# Patient Record
Sex: MALE | Race: WHITE | NOT HISPANIC OR LATINO | Employment: FULL TIME | URBAN - METROPOLITAN AREA
[De-identification: names, ages, dates, MRNs, and addresses within clinical notes are randomized per-mention and may not be internally consistent; named-entity substitution may affect disease eponyms.]

---

## 2017-05-16 ENCOUNTER — APPOINTMENT (OUTPATIENT)
Dept: GENERAL RADIOLOGY | Facility: HOSPITAL | Age: 27
End: 2017-05-16

## 2017-05-16 ENCOUNTER — HOSPITAL ENCOUNTER (EMERGENCY)
Facility: HOSPITAL | Age: 27
Discharge: HOME | End: 2017-05-17
Attending: EMERGENCY MEDICINE | Admitting: EMERGENCY MEDICINE

## 2017-05-16 VITALS
HEART RATE: 111 BPM | DIASTOLIC BLOOD PRESSURE: 106 MMHG | BODY MASS INDEX: 40.63 KG/M2 | RESPIRATION RATE: 18 BRPM | TEMPERATURE: 98.7 F | HEIGHT: 72 IN | OXYGEN SATURATION: 94 % | WEIGHT: 300 LBS | SYSTOLIC BLOOD PRESSURE: 172 MMHG

## 2017-05-16 DIAGNOSIS — S56.912A FOREARM STRAIN, LEFT, INITIAL ENCOUNTER: ICD-10-CM

## 2017-05-16 DIAGNOSIS — S53.401A ELBOW SPRAIN, RIGHT, INITIAL ENCOUNTER: ICD-10-CM

## 2017-05-16 DIAGNOSIS — S53.402A ELBOW SPRAIN, LEFT, INITIAL ENCOUNTER: ICD-10-CM

## 2017-05-16 DIAGNOSIS — S63.501A WRIST SPRAIN, RIGHT, INITIAL ENCOUNTER: ICD-10-CM

## 2017-05-16 DIAGNOSIS — S80.01XA CONTUSION OF RIGHT KNEE, INITIAL ENCOUNTER: ICD-10-CM

## 2017-05-16 DIAGNOSIS — S63.502A WRIST SPRAIN, LEFT, INITIAL ENCOUNTER: Primary | ICD-10-CM

## 2017-05-16 DIAGNOSIS — S56.911A FOREARM STRAIN, RIGHT, INITIAL ENCOUNTER: ICD-10-CM

## 2017-05-16 PROCEDURE — 99283 EMERGENCY DEPT VISIT LOW MDM: CPT

## 2017-05-16 PROCEDURE — 25010000002 TDAP 5-2.5-18.5 LF-MCG/0.5 SUSPENSION

## 2017-05-16 PROCEDURE — 73130 X-RAY EXAM OF HAND: CPT

## 2017-05-16 PROCEDURE — 90471 IMMUNIZATION ADMIN: CPT

## 2017-05-16 PROCEDURE — 73060 X-RAY EXAM OF HUMERUS: CPT

## 2017-05-16 PROCEDURE — 73562 X-RAY EXAM OF KNEE 3: CPT

## 2017-05-16 PROCEDURE — 90715 TDAP VACCINE 7 YRS/> IM: CPT

## 2017-05-16 PROCEDURE — 73090 X-RAY EXAM OF FOREARM: CPT

## 2017-05-16 PROCEDURE — 99284 EMERGENCY DEPT VISIT MOD MDM: CPT | Performed by: EMERGENCY MEDICINE

## 2017-05-16 RX ADMIN — TETANUS TOXOID, REDUCED DIPHTHERIA TOXOID AND ACELLULAR PERTUSSIS VACCINE, ADSORBED 0.5 ML: 5; 2.5; 8; 8; 2.5 SUSPENSION INTRAMUSCULAR at 22:56

## 2017-05-17 ENCOUNTER — HOSPITAL ENCOUNTER (OUTPATIENT)
Dept: GENERAL RADIOLOGY | Facility: HOSPITAL | Age: 27
Discharge: HOME | End: 2017-05-17
Attending: EMERGENCY MEDICINE | Admitting: EMERGENCY MEDICINE

## 2017-05-17 ENCOUNTER — APPOINTMENT (OUTPATIENT)
Dept: GENERAL RADIOLOGY | Facility: HOSPITAL | Age: 27
End: 2017-05-17

## 2017-05-17 ENCOUNTER — TRANSCRIBE ORDERS (OUTPATIENT)
Dept: ADMINISTRATIVE | Facility: HOSPITAL | Age: 27
End: 2017-05-17

## 2017-05-17 DIAGNOSIS — S59.902A ELBOW INJURY, LEFT, INITIAL ENCOUNTER: ICD-10-CM

## 2017-05-17 DIAGNOSIS — M25.571 RIGHT ANKLE PAIN, UNSPECIFIED CHRONICITY: Primary | ICD-10-CM

## 2017-05-17 DIAGNOSIS — M25.522 LEFT ELBOW PAIN: ICD-10-CM

## 2017-05-17 DIAGNOSIS — M25.571 RIGHT ANKLE PAIN, UNSPECIFIED CHRONICITY: ICD-10-CM

## 2017-05-17 PROCEDURE — 73080 X-RAY EXAM OF ELBOW: CPT

## 2017-05-17 RX ORDER — CYCLOBENZAPRINE HCL 10 MG
10 TABLET ORAL 3 TIMES DAILY PRN
Qty: 24 TABLET | Refills: 0 | Status: SHIPPED | OUTPATIENT
Start: 2017-05-17 | End: 2022-07-21

## 2017-05-17 RX ORDER — TRAMADOL HYDROCHLORIDE 50 MG/1
TABLET ORAL
Qty: 30 TABLET | Refills: 0 | Status: SHIPPED | OUTPATIENT
Start: 2017-05-17 | End: 2017-06-21

## 2017-05-22 ENCOUNTER — OFFICE VISIT (OUTPATIENT)
Dept: ORTHOPEDIC SURGERY | Facility: CLINIC | Age: 27
End: 2017-05-22

## 2017-05-22 VITALS
WEIGHT: 300 LBS | DIASTOLIC BLOOD PRESSURE: 91 MMHG | HEIGHT: 72 IN | SYSTOLIC BLOOD PRESSURE: 134 MMHG | BODY MASS INDEX: 40.63 KG/M2 | HEART RATE: 106 BPM

## 2017-05-22 DIAGNOSIS — S52.135A CLOSED NONDISPLACED FRACTURE OF NECK OF LEFT RADIUS, INITIAL ENCOUNTER: Primary | ICD-10-CM

## 2017-05-22 PROCEDURE — 99203 OFFICE O/P NEW LOW 30 MIN: CPT | Performed by: ORTHOPAEDIC SURGERY

## 2017-06-07 ENCOUNTER — OFFICE VISIT (OUTPATIENT)
Dept: ORTHOPEDIC SURGERY | Facility: CLINIC | Age: 27
End: 2017-06-07

## 2017-06-07 VITALS — WEIGHT: 300 LBS | HEIGHT: 72 IN | BODY MASS INDEX: 40.63 KG/M2

## 2017-06-07 DIAGNOSIS — R52 PAIN: Primary | ICD-10-CM

## 2017-06-07 DIAGNOSIS — S52.135A CLOSED NONDISPLACED FRACTURE OF NECK OF LEFT RADIUS, INITIAL ENCOUNTER: ICD-10-CM

## 2017-06-07 DIAGNOSIS — S40.021D CONTUSION OF RIGHT UPPER ARM, SUBSEQUENT ENCOUNTER: ICD-10-CM

## 2017-06-07 PROCEDURE — 99024 POSTOP FOLLOW-UP VISIT: CPT | Performed by: ORTHOPAEDIC SURGERY

## 2017-06-07 PROCEDURE — 73080 X-RAY EXAM OF ELBOW: CPT | Performed by: ORTHOPAEDIC SURGERY

## 2017-06-07 RX ORDER — MELOXICAM 15 MG/1
15 TABLET ORAL DAILY
Qty: 30 TABLET | Refills: 5 | Status: SHIPPED | OUTPATIENT
Start: 2017-06-07 | End: 2022-07-21

## 2017-06-07 RX ORDER — MELOXICAM 15 MG/1
15 TABLET ORAL DAILY
Qty: 30 TABLET | Refills: 5 | Status: SHIPPED | OUTPATIENT
Start: 2017-06-07 | End: 2017-06-07 | Stop reason: SDUPTHER

## 2017-06-07 NOTE — PROGRESS NOTES
Subjective; left elbow pain, right arm pain   Patient ID: Lexx Reyna is a 26 y.o. male.    Chief Complaint:    History of Present Illness patient seen in follow-up to the work-related injury that resulted nondisplaced radial neck fracture contusion of the right upper extremity.  The splint is removed from the left upper extremity is limited range of motion as expected.  Still having mild to moderate discomfort in the right upper extremity sores use of the elbow and the hand.       Social History     Occupational History   • Not on file.     Social History Main Topics   • Smoking status: Never Smoker   • Smokeless tobacco: Never Used   • Alcohol use No   • Drug use: No   • Sexual activity: Defer      Review of Systems   Constitutional: Negative for chills, diaphoresis, fever and unexpected weight change.   HENT: Negative for hearing loss, nosebleeds, sore throat and tinnitus.    Eyes: Negative for pain and visual disturbance.   Respiratory: Negative for cough, shortness of breath and wheezing.    Cardiovascular: Negative for chest pain and palpitations.   Gastrointestinal: Negative for abdominal pain, diarrhea, nausea and vomiting.   Endocrine: Negative for cold intolerance, heat intolerance and polydipsia.   Genitourinary: Negative for difficulty urinating, dysuria and hematuria.   Musculoskeletal: Positive for joint swelling and myalgias. Negative for arthralgias.   Skin: Negative for rash and wound.   Allergic/Immunologic: Negative for environmental allergies.   Neurological: Negative for dizziness, syncope and numbness.   Hematological: Does not bruise/bleed easily.   Psychiatric/Behavioral: Negative for dysphoric mood and sleep disturbance. The patient is not nervous/anxious.          History reviewed. No pertinent past medical history.  History reviewed. No pertinent surgical history.  Family History   Problem Relation Age of Onset   • No Known Problems Mother    • Kidney disease Father           Objective:  There were no vitals filed for this visit.  Last 3 weights    06/07/17  0909   Weight: 300 lb (136 kg)     Body mass index is 40.69 kg/(m^2).       Ortho Exam  x-ray of the left elbow shows a nondisplaced fracture no change in the position compared to previous x-rays.  On exam there is no motor deficit he only has probably 30-40° of range of motion with regard to the elbow.  somewhere between 30° to 70°.  Less than 10° of supination full pronation.  No motor deficit good distal pulses.  With regard to the right upper extremity again I reviewed those x-rays done on the time of the injury is no acute fracture.  He lacks maybe 10° of full extension of the elbow.  Full range of motion of the wrist and hand but complains of weakness.    Assessment:       1. Pain          Plan:  We'll begin Mobic 15 a day begin moist heat to both upper extremities return to see me in 2 weeks if still symptomatic we'll begin physical therapy off work until seen          Work Status:    CHELSI query complete.    Orders:  Orders Placed This Encounter   Procedures   • XR Elbow 3+ View Left       Medications:  No orders of the defined types were placed in this encounter.      Followup:  No Follow-up on file.          Dragon transcription disclaimer     Much of this encounter note is an electronic transcription/translation of spoken language to printed text. The electronic translation of spoken language may permit erroneous, or at times, nonsensical words or phrases to be inadvertently transcribed. Although I have reviewed the note for such errors, some may still exist.

## 2017-06-21 ENCOUNTER — OFFICE VISIT (OUTPATIENT)
Dept: ORTHOPEDIC SURGERY | Facility: CLINIC | Age: 27
End: 2017-06-21

## 2017-06-21 DIAGNOSIS — S52.135A CLOSED NONDISPLACED FRACTURE OF NECK OF LEFT RADIUS, INITIAL ENCOUNTER: ICD-10-CM

## 2017-06-21 DIAGNOSIS — R52 PAIN: Primary | ICD-10-CM

## 2017-06-21 DIAGNOSIS — S40.021D CONTUSION OF RIGHT UPPER ARM, SUBSEQUENT ENCOUNTER: ICD-10-CM

## 2017-06-21 PROCEDURE — 99024 POSTOP FOLLOW-UP VISIT: CPT | Performed by: ORTHOPAEDIC SURGERY

## 2017-06-21 NOTE — PROGRESS NOTES
Subjective: Status post left radial neck fracture     Patient ID: Lexx Reyna is a 26 y.o. male.    Chief Complaint:    History of Present Illness 26-year-old male is now approximate 5 weeks out from his work-related injury of the radial neck fracture.  His motion is improving but still having some pain has not regained full use of that arm.       Social History     Occupational History   • Not on file.     Social History Main Topics   • Smoking status: Never Smoker   • Smokeless tobacco: Never Used   • Alcohol use No   • Drug use: No   • Sexual activity: Defer      Review of Systems   Constitutional: Negative for chills, diaphoresis, fever and unexpected weight change.   HENT: Negative for hearing loss, nosebleeds, sore throat and tinnitus.    Eyes: Negative for pain and visual disturbance.   Respiratory: Negative for cough, shortness of breath and wheezing.    Cardiovascular: Negative for chest pain and palpitations.   Gastrointestinal: Negative for abdominal pain, diarrhea, nausea and vomiting.   Endocrine: Negative for cold intolerance, heat intolerance and polydipsia.   Genitourinary: Negative for difficulty urinating, dysuria and hematuria.   Musculoskeletal: Positive for joint swelling and myalgias. Negative for arthralgias.   Skin: Negative for rash and wound.   Allergic/Immunologic: Negative for environmental allergies.   Neurological: Negative for dizziness, syncope and numbness.   Hematological: Does not bruise/bleed easily.   Psychiatric/Behavioral: Negative for dysphoric mood and sleep disturbance. The patient is not nervous/anxious.    All other systems reviewed and are negative.        History reviewed. No pertinent past medical history.  History reviewed. No pertinent surgical history.  Family History   Problem Relation Age of Onset   • No Known Problems Mother    • Kidney disease Father          Objective:  There were no vitals filed for this visit.  There were no vitals filed for this  visit.  There is no height or weight on file to calculate BMI.       Ortho Exam  is alert and oriented ×3.  He has 20° to 15° of extension flexion about 125°.  Full pronation supination.  No motor deficit good distal pulses no sensory loss.  No appreciable swelling noted.    Assessment:       1. Pain    2. Closed nondisplaced fracture of neck of left radius, initial encounter    3. Contusion of right upper arm, subsequent encounter          Plan:      continue working on stretching exercises.  Reviewed a stretching program with him.  Continue the Mobic.  Return to see me in 2 weeks reevaluate regarding returning to work.  Off work until seen.      Work Status:    CHELSI query complete.    Orders:  No orders of the defined types were placed in this encounter.      Medications:  No orders of the defined types were placed in this encounter.      Followup:  Return in about 2 weeks (around 7/5/2017).          Dragon transcription disclaimer     Much of this encounter note is an electronic transcription/translation of spoken language to printed text. The electronic translation of spoken language may permit erroneous, or at times, nonsensical words or phrases to be inadvertently transcribed. Although I have reviewed the note for such errors, some may still exist.

## 2017-07-06 ENCOUNTER — OFFICE VISIT (OUTPATIENT)
Dept: ORTHOPEDIC SURGERY | Facility: CLINIC | Age: 27
End: 2017-07-06

## 2017-07-06 DIAGNOSIS — R52 PAIN: Primary | ICD-10-CM

## 2017-07-06 DIAGNOSIS — S52.135A CLOSED NONDISPLACED FRACTURE OF NECK OF LEFT RADIUS, INITIAL ENCOUNTER: ICD-10-CM

## 2017-07-06 DIAGNOSIS — S40.021D CONTUSION OF RIGHT UPPER ARM, SUBSEQUENT ENCOUNTER: ICD-10-CM

## 2017-07-06 PROCEDURE — 99024 POSTOP FOLLOW-UP VISIT: CPT | Performed by: ORTHOPAEDIC SURGERY

## 2017-07-06 NOTE — PROGRESS NOTES
Subjective: Left radial neck fracture     Patient ID: Lexx Reyna is a 26 y.o. male.    Chief Complaint:    History of Present Illness patient is 6-7 weeks out from his injury.  He is improving as far as regaining extension lacks just a few degrees of extension.  Still some pain with any type of exertional activity.       Social History     Occupational History   • Not on file.     Social History Main Topics   • Smoking status: Never Smoker   • Smokeless tobacco: Never Used   • Alcohol use No   • Drug use: No   • Sexual activity: Defer      Review of Systems   Constitutional: Negative for chills, diaphoresis, fever and unexpected weight change.   HENT: Negative for hearing loss, nosebleeds, sore throat and tinnitus.    Eyes: Negative for pain and visual disturbance.   Respiratory: Negative for cough, shortness of breath and wheezing.    Cardiovascular: Negative for chest pain and palpitations.   Gastrointestinal: Negative for abdominal pain, diarrhea, nausea and vomiting.   Endocrine: Negative for cold intolerance, heat intolerance and polydipsia.   Genitourinary: Negative for difficulty urinating, dysuria and hematuria.   Musculoskeletal: Positive for joint swelling. Negative for arthralgias and myalgias.   Skin: Negative for rash and wound.   Allergic/Immunologic: Negative for environmental allergies.   Neurological: Negative for dizziness, syncope and numbness.   Hematological: Does not bruise/bleed easily.   Psychiatric/Behavioral: Negative for dysphoric mood and sleep disturbance. The patient is not nervous/anxious.          No past medical history on file.  No past surgical history on file.  Family History   Problem Relation Age of Onset   • No Known Problems Mother    • Kidney disease Father          Objective:  There were no vitals filed for this visit.  There were no vitals filed for this visit.  There is no height or weight on file to calculate BMI.       Ortho Exam  is alert and oriented ×3.  He  lacks less than 5° of extension and has full flexion and pronation supination.  No motor deficit good distal pulses no sensory loss.  Biceps function is 5 over 5.    Assessment:       1. Pain    2. Closed nondisplaced fracture of neck of left radius, initial encounter    3. Contusion of right upper arm, subsequent encounter          Plan:      he still having some discomfort with any activities him and give him 3 more weeks and reevaluate at that time should be ready to return to work at that time.  Continue the anti-inflammatory in the meantime in his exercise      Work Status:    CHELSI query complete.    Orders:  No orders of the defined types were placed in this encounter.      Medications:  No orders of the defined types were placed in this encounter.      Followup:  Return in about 3 weeks (around 7/27/2017).          Dragon transcription disclaimer     Much of this encounter note is an electronic transcription/translation of spoken language to printed text. The electronic translation of spoken language may permit erroneous, or at times, nonsensical words or phrases to be inadvertently transcribed. Although I have reviewed the note for such errors, some may still exist.

## 2017-07-27 ENCOUNTER — OFFICE VISIT (OUTPATIENT)
Dept: ORTHOPEDIC SURGERY | Facility: CLINIC | Age: 27
End: 2017-07-27

## 2017-07-27 DIAGNOSIS — S52.135A CLOSED NONDISPLACED FRACTURE OF NECK OF LEFT RADIUS, INITIAL ENCOUNTER: ICD-10-CM

## 2017-07-27 DIAGNOSIS — R52 PAIN: Primary | ICD-10-CM

## 2017-07-27 DIAGNOSIS — S40.021D CONTUSION OF RIGHT UPPER ARM, SUBSEQUENT ENCOUNTER: ICD-10-CM

## 2017-07-27 PROCEDURE — 99024 POSTOP FOLLOW-UP VISIT: CPT | Performed by: ORTHOPAEDIC SURGERY

## 2017-07-27 NOTE — PROGRESS NOTES
Subjective: Nondisplaced left radial head fracture     Patient ID: Lexx Reyna is a 26 y.o. male.    Chief Complaint:    History of Present Illness patient seen in follow-up is over 2 months out from his injury and is doing extremely well today with no pain and/or discomfort.  He is regained full range of motion of the elbow.       Social History     Occupational History   • Not on file.     Social History Main Topics   • Smoking status: Never Smoker   • Smokeless tobacco: Never Used   • Alcohol use No   • Drug use: No   • Sexual activity: Defer      Review of Systems   Constitutional: Negative for chills, diaphoresis, fever and unexpected weight change.   HENT: Negative for hearing loss, nosebleeds, sore throat and tinnitus.    Eyes: Negative for pain and visual disturbance.   Respiratory: Negative for cough, shortness of breath and wheezing.    Cardiovascular: Negative for chest pain and palpitations.   Gastrointestinal: Negative for abdominal pain, diarrhea, nausea and vomiting.   Endocrine: Negative for cold intolerance, heat intolerance and polydipsia.   Genitourinary: Negative for difficulty urinating, dysuria and hematuria.   Musculoskeletal: Positive for arthralgias. Negative for joint swelling and myalgias.   Skin: Negative for rash and wound.   Allergic/Immunologic: Negative for environmental allergies.   Neurological: Negative for dizziness, syncope and numbness.   Hematological: Does not bruise/bleed easily.   Psychiatric/Behavioral: Negative for dysphoric mood and sleep disturbance. The patient is not nervous/anxious.    All other systems reviewed and are negative.        No past medical history on file.  No past surgical history on file.  Family History   Problem Relation Age of Onset   • No Known Problems Mother    • Kidney disease Father          Objective:  There were no vitals filed for this visit.  There were no vitals filed for this visit.  There is no height or weight on file to calculate  BMI.       Ortho Exam  alert and oriented ×3.  He has full pronation supination flexion and extension of the elbow.  Flexion and extension of the elbow against resistance is intact with no weakness.  No motor deficit good distal pulses    Assessment:       1. Pain    2. Closed nondisplaced fracture of neck of left radius, initial encounter    3. Contusion of right upper arm, subsequent encounter          Plan:      return to work Saturday with no restrictions return to see me when necessary      Work Status:    CHELSI query complete.    Orders:  No orders of the defined types were placed in this encounter.      Medications:  No orders of the defined types were placed in this encounter.      Followup:  Return if symptoms worsen or fail to improve.          Dragon transcription disclaimer     Much of this encounter note is an electronic transcription/translation of spoken language to printed text. The electronic translation of spoken language may permit erroneous, or at times, nonsensical words or phrases to be inadvertently transcribed. Although I have reviewed the note for such errors, some may still exist.

## 2022-07-21 ENCOUNTER — OFFICE VISIT (OUTPATIENT)
Dept: FAMILY MEDICINE CLINIC | Facility: CLINIC | Age: 32
End: 2022-07-21

## 2022-07-21 VITALS
HEIGHT: 72 IN | WEIGHT: 249.2 LBS | OXYGEN SATURATION: 98 % | HEART RATE: 88 BPM | BODY MASS INDEX: 33.75 KG/M2 | SYSTOLIC BLOOD PRESSURE: 150 MMHG | DIASTOLIC BLOOD PRESSURE: 100 MMHG

## 2022-07-21 DIAGNOSIS — E03.9 ACQUIRED HYPOTHYROIDISM: ICD-10-CM

## 2022-07-21 DIAGNOSIS — E11.9 TYPE 2 DIABETES MELLITUS WITHOUT COMPLICATION, WITHOUT LONG-TERM CURRENT USE OF INSULIN: Primary | ICD-10-CM

## 2022-07-21 DIAGNOSIS — I10 PRIMARY HYPERTENSION: ICD-10-CM

## 2022-07-21 PROCEDURE — 99203 OFFICE O/P NEW LOW 30 MIN: CPT | Performed by: FAMILY MEDICINE

## 2022-07-21 RX ORDER — LISINOPRIL AND HYDROCHLOROTHIAZIDE 20; 12.5 MG/1; MG/1
1 TABLET ORAL DAILY
Qty: 90 TABLET | Refills: 1 | Status: SHIPPED | OUTPATIENT
Start: 2022-07-21 | End: 2022-09-02 | Stop reason: SDUPTHER

## 2022-07-21 RX ORDER — LISINOPRIL 40 MG/1
40 TABLET ORAL DAILY
COMMUNITY
End: 2022-07-21

## 2022-07-21 RX ORDER — LEVOTHYROXINE SODIUM 0.05 MG/1
50 TABLET ORAL DAILY
Qty: 90 TABLET | Refills: 1 | Status: SHIPPED | OUTPATIENT
Start: 2022-07-21 | End: 2022-09-02 | Stop reason: SDUPTHER

## 2022-07-21 RX ORDER — LEVOTHYROXINE SODIUM 0.05 MG/1
50 TABLET ORAL DAILY
COMMUNITY
End: 2022-07-21 | Stop reason: SDUPTHER

## 2022-07-21 RX ORDER — METFORMIN HYDROCHLORIDE 500 MG/1
500 TABLET, EXTENDED RELEASE ORAL 2 TIMES DAILY WITH MEALS
Qty: 360 TABLET | Refills: 1 | Status: SHIPPED | OUTPATIENT
Start: 2022-07-21 | End: 2022-09-02 | Stop reason: SDUPTHER

## 2022-07-21 RX ORDER — GLIMEPIRIDE 2 MG/1
2 TABLET ORAL
Qty: 180 TABLET | Refills: 1 | Status: SHIPPED | OUTPATIENT
Start: 2022-07-21 | End: 2022-09-02 | Stop reason: SDUPTHER

## 2022-07-21 NOTE — PROGRESS NOTES
"Chief Complaint  Annual Exam    Subjective          Lexx Reyna presents to [facility]  Patient states he needs to have a establish care and needs treatment for his medical problems he reports that he has had diabetes for about  4 to 5 years and reports that he is taking metformin he reports his sugars been running about 250 as well.  He states his blood pressure he takes lisinopril 40 mg and reports his blood pressure been running about 140/100 recently as well he also reports that he had taking thyroid medications but has been out of it for over a month.  He states otherwise he has been feeling tired he says that he has been peeing a lot recently and reports that really he has been little more sluggish than normal as well      Objective   Vital Signs:   /100   Pulse 88   Ht 182.9 cm (72\")   Wt 113 kg (249 lb 3.2 oz)   SpO2 98%   BMI 33.80 kg/m²     Body mass index is 33.8 kg/m².    Review of Systems   Constitutional: Positive for fatigue.   HENT: Negative for congestion, dental problem, ear discharge, ear pain and sore throat.    Respiratory: Negative for apnea, chest tightness and shortness of breath.    Gastrointestinal: Negative for constipation and nausea.   Endocrine: Positive for polyuria.   Genitourinary: Positive for frequency. Negative for difficulty urinating.   Musculoskeletal: Negative for arthralgias and gait problem.   Skin: Negative for rash.   Hematological: Negative for adenopathy.       Past History:  Medical History: has a past medical history of Diabetes mellitus (HCC), Hyperlipidemia, Hypertension, and Hyperthyroidism.   Surgical History: has no past surgical history on file.         Current Outpatient Medications:   •  levothyroxine (SYNTHROID, LEVOTHROID) 50 MCG tablet, Take 1 tablet by mouth Daily., Disp: 90 tablet, Rfl: 1  •  glimepiride (Amaryl) 2 MG tablet, Take 1 tablet by mouth Every Morning Before Breakfast., Disp: 180 tablet, Rfl: 1  •  " lisinopril-hydrochlorothiazide (Zestoretic) 20-12.5 MG per tablet, Take 1 tablet by mouth Daily., Disp: 90 tablet, Rfl: 1  •  metFORMIN ER (GLUCOPHAGE-XR) 500 MG 24 hr tablet, Take 1 tablet by mouth 2 (Two) Times a Day With Meals., Disp: 360 tablet, Rfl: 1    Allergies: Patient has no known allergies.    Physical Exam  Vitals reviewed.   Constitutional:       Appearance: Normal appearance.   HENT:      Head: Normocephalic.      Right Ear: Tympanic membrane, ear canal and external ear normal.      Left Ear: Tympanic membrane, ear canal and external ear normal.      Nose: Nose normal.      Mouth/Throat:      Pharynx: Oropharynx is clear.   Eyes:      Pupils: Pupils are equal, round, and reactive to light.   Cardiovascular:      Rate and Rhythm: Normal rate and regular rhythm.      Pulses: Normal pulses.   Pulmonary:      Effort: Pulmonary effort is normal.      Breath sounds: Normal breath sounds.   Abdominal:      General: Abdomen is flat. Bowel sounds are normal.      Palpations: Abdomen is soft.   Musculoskeletal:         General: Normal range of motion.   Skin:     General: Skin is warm and dry.   Neurological:      General: No focal deficit present.      Mental Status: He is alert and oriented to person, place, and time.          Result Review :                   Assessment and Plan    Diagnoses and all orders for this visit:    1. Type 2 diabetes mellitus without complication, without long-term current use of insulin (HCC) (Primary)  Comments:  Will switch to XR metformin and add Amaryl 2 mg daily and recheck his sugar in about 6 weeks  Orders:  -     metFORMIN ER (GLUCOPHAGE-XR) 500 MG 24 hr tablet; Take 1 tablet by mouth 2 (Two) Times a Day With Meals.  Dispense: 360 tablet; Refill: 1  -     glimepiride (Amaryl) 2 MG tablet; Take 1 tablet by mouth Every Morning Before Breakfast.  Dispense: 180 tablet; Refill: 1    2. Primary hypertension  Comments:  Switch from lisinopril 40 to Zestoretic 2012.5 monitor blood  pressure recheck in about 6 weeks  Orders:  -     lisinopril-hydrochlorothiazide (Zestoretic) 20-12.5 MG per tablet; Take 1 tablet by mouth Daily.  Dispense: 90 tablet; Refill: 1    3. Acquired hypothyroidism  Comments:  Restart his thyroid medicine and then about 6 to 8 weeks we will get thyroid level  Orders:  -     levothyroxine (SYNTHROID, LEVOTHROID) 50 MCG tablet; Take 1 tablet by mouth Daily.  Dispense: 90 tablet; Refill: 1              Follow Up   Return in about 2 months (around 9/21/2022).  Patient was given instructions and counseling regarding his condition or for health maintenance advice. Please see specific information pulled into the AVS if appropriate.     Ruben Lee MD

## 2022-09-02 ENCOUNTER — OFFICE VISIT (OUTPATIENT)
Dept: FAMILY MEDICINE CLINIC | Facility: CLINIC | Age: 32
End: 2022-09-02

## 2022-09-02 VITALS
WEIGHT: 243.3 LBS | SYSTOLIC BLOOD PRESSURE: 148 MMHG | DIASTOLIC BLOOD PRESSURE: 88 MMHG | OXYGEN SATURATION: 98 % | HEIGHT: 71 IN | HEART RATE: 76 BPM | BODY MASS INDEX: 34.06 KG/M2

## 2022-09-02 DIAGNOSIS — I10 PRIMARY HYPERTENSION: ICD-10-CM

## 2022-09-02 DIAGNOSIS — E03.9 ACQUIRED HYPOTHYROIDISM: ICD-10-CM

## 2022-09-02 DIAGNOSIS — E11.9 TYPE 2 DIABETES MELLITUS WITHOUT COMPLICATION, WITHOUT LONG-TERM CURRENT USE OF INSULIN: Primary | ICD-10-CM

## 2022-09-02 PROCEDURE — 36415 COLL VENOUS BLD VENIPUNCTURE: CPT | Performed by: FAMILY MEDICINE

## 2022-09-02 PROCEDURE — 99214 OFFICE O/P EST MOD 30 MIN: CPT | Performed by: FAMILY MEDICINE

## 2022-09-02 RX ORDER — METFORMIN HYDROCHLORIDE 500 MG/1
500 TABLET, EXTENDED RELEASE ORAL 2 TIMES DAILY WITH MEALS
Qty: 360 TABLET | Refills: 1 | Status: SHIPPED | OUTPATIENT
Start: 2022-09-02

## 2022-09-02 RX ORDER — GLIMEPIRIDE 2 MG/1
2 TABLET ORAL
Qty: 180 TABLET | Refills: 1 | Status: SHIPPED | OUTPATIENT
Start: 2022-09-02 | End: 2022-09-06 | Stop reason: SDUPTHER

## 2022-09-02 RX ORDER — LISINOPRIL AND HYDROCHLOROTHIAZIDE 20; 12.5 MG/1; MG/1
1 TABLET ORAL DAILY
Qty: 90 TABLET | Refills: 1 | Status: SHIPPED | OUTPATIENT
Start: 2022-09-02 | End: 2023-01-24 | Stop reason: SDUPTHER

## 2022-09-02 RX ORDER — LEVOTHYROXINE SODIUM 0.05 MG/1
50 TABLET ORAL DAILY
Qty: 90 TABLET | Refills: 1 | Status: SHIPPED | OUTPATIENT
Start: 2022-09-02 | End: 2023-01-24 | Stop reason: SDUPTHER

## 2022-09-02 NOTE — PROGRESS NOTES
"Chief Complaint  Diabetes and Hypertension    Subjective          Lexx Reyna presents to [facility]  Patient comes in today saying he is much improved his blood pressure is down he is not urinating a lot he was in his weights actually come up a little bit he says he has not had any other difficulties except occasionally when he stands up he feels a little lightheadedness he denies any activities or problems states generally he feels much improved      Objective   Vital Signs:   /88   Pulse 76   Ht 180.3 cm (71\")   Wt 110 kg (243 lb 4.8 oz)   SpO2 98%   BMI 33.93 kg/m²     Body mass index is 33.93 kg/m².    Review of Systems   Constitutional: Negative.    HENT: Negative for congestion, dental problem, ear discharge, ear pain and sore throat.    Respiratory: Negative for apnea, chest tightness and shortness of breath.    Gastrointestinal: Negative for constipation and nausea.   Endocrine: Negative for polyuria.   Genitourinary: Negative for difficulty urinating.   Musculoskeletal: Negative for arthralgias and gait problem.   Skin: Negative for rash.   Neurological: Positive for light-headedness.   Hematological: Negative for adenopathy.       Past History:  Medical History: has a past medical history of Diabetes mellitus (HCC), Hyperlipidemia, Hypertension, and Hyperthyroidism.   Surgical History: has no past surgical history on file.         Current Outpatient Medications:   •  glimepiride (Amaryl) 2 MG tablet, Take 1 tablet by mouth Every Morning Before Breakfast., Disp: 180 tablet, Rfl: 1  •  levothyroxine (SYNTHROID, LEVOTHROID) 50 MCG tablet, Take 1 tablet by mouth Daily., Disp: 90 tablet, Rfl: 1  •  lisinopril-hydrochlorothiazide (Zestoretic) 20-12.5 MG per tablet, Take 1 tablet by mouth Daily., Disp: 90 tablet, Rfl: 1  •  metFORMIN ER (GLUCOPHAGE-XR) 500 MG 24 hr tablet, Take 1 tablet by mouth 2 (Two) Times a Day With Meals., Disp: 360 tablet, Rfl: 1    Allergies: " Patient has no known allergies.    Physical Exam  Vitals reviewed.   Constitutional:       Appearance: Normal appearance.   HENT:      Head: Normocephalic.      Right Ear: Tympanic membrane, ear canal and external ear normal.      Left Ear: Tympanic membrane, ear canal and external ear normal.      Nose: Nose normal.      Mouth/Throat:      Pharynx: Oropharynx is clear.   Eyes:      Pupils: Pupils are equal, round, and reactive to light.   Cardiovascular:      Rate and Rhythm: Normal rate and regular rhythm.      Pulses: Normal pulses.   Pulmonary:      Effort: Pulmonary effort is normal.      Breath sounds: Normal breath sounds.   Abdominal:      General: Abdomen is flat. Bowel sounds are normal.      Palpations: Abdomen is soft.   Musculoskeletal:         General: Normal range of motion.   Skin:     General: Skin is warm and dry.   Neurological:      General: No focal deficit present.      Mental Status: He is alert and oriented to person, place, and time.          Result Review :                   Assessment and Plan    Diagnoses and all orders for this visit:    1. Type 2 diabetes mellitus without complication, without long-term current use of insulin (HCC) (Primary)  Comments:  We will continue medications get blood work and see how he does just these  Orders:  -     Comprehensive Metabolic Panel; Future  -     Hemoglobin A1c; Future  -     Lipid Panel; Future    2. Primary hypertension  Comments:  Recheck blood pressure was basically 140/85 he does state he has some lightheadedness when he stands we will continue his medications and monitor    3. Acquired hypothyroidism  Comments:  We will get TSH and monitor  Orders:  -     TSH; Future    Other orders  -     metFORMIN ER (GLUCOPHAGE-XR) 500 MG 24 hr tablet; Take 1 tablet by mouth 2 (Two) Times a Day With Meals.  Dispense: 360 tablet; Refill: 1  -     lisinopril-hydrochlorothiazide (Zestoretic) 20-12.5 MG per tablet; Take 1 tablet by mouth Daily.  Dispense: 90  tablet; Refill: 1  -     levothyroxine (SYNTHROID, LEVOTHROID) 50 MCG tablet; Take 1 tablet by mouth Daily.  Dispense: 90 tablet; Refill: 1  -     glimepiride (Amaryl) 2 MG tablet; Take 1 tablet by mouth Every Morning Before Breakfast.  Dispense: 180 tablet; Refill: 1              Follow Up   Return in about 6 months (around 3/2/2023).  Patient was given instructions and counseling regarding his condition or for health maintenance advice. Please see specific information pulled into the AVS if appropriate.     Ruben Lee MD

## 2022-09-03 LAB
ALBUMIN SERPL-MCNC: 4.5 G/DL (ref 4–5)
ALBUMIN/GLOB SERPL: 1.7 {RATIO} (ref 1.2–2.2)
ALP SERPL-CCNC: 89 IU/L (ref 44–121)
ALT SERPL-CCNC: 37 IU/L (ref 0–44)
AST SERPL-CCNC: 26 IU/L (ref 0–40)
BILIRUB SERPL-MCNC: 0.4 MG/DL (ref 0–1.2)
BUN SERPL-MCNC: 14 MG/DL (ref 6–20)
BUN/CREAT SERPL: 10 (ref 9–20)
CALCIUM SERPL-MCNC: 10.1 MG/DL (ref 8.7–10.2)
CHLORIDE SERPL-SCNC: 99 MMOL/L (ref 96–106)
CHOLEST SERPL-MCNC: 231 MG/DL (ref 100–199)
CO2 SERPL-SCNC: 23 MMOL/L (ref 20–29)
CREAT SERPL-MCNC: 1.34 MG/DL (ref 0.76–1.27)
EGFRCR-CYS SERPLBLD CKD-EPI 2021: 73 ML/MIN/1.73
GLOBULIN SER CALC-MCNC: 2.6 G/DL (ref 1.5–4.5)
GLUCOSE SERPL-MCNC: 243 MG/DL (ref 65–99)
HBA1C MFR BLD: 9.8 % (ref 4.8–5.6)
HDLC SERPL-MCNC: 33 MG/DL
LDLC SERPL CALC-MCNC: 115 MG/DL (ref 0–99)
POTASSIUM SERPL-SCNC: 4.7 MMOL/L (ref 3.5–5.2)
PROT SERPL-MCNC: 7.1 G/DL (ref 6–8.5)
SODIUM SERPL-SCNC: 140 MMOL/L (ref 134–144)
TRIGL SERPL-MCNC: 475 MG/DL (ref 0–149)
TSH SERPL DL<=0.005 MIU/L-ACNC: 4.17 UIU/ML (ref 0.45–4.5)
VLDLC SERPL CALC-MCNC: 83 MG/DL (ref 5–40)

## 2022-09-06 RX ORDER — GLIMEPIRIDE 2 MG/1
TABLET ORAL
Qty: 270 TABLET | Refills: 1 | Status: SHIPPED | OUTPATIENT
Start: 2022-09-06 | End: 2022-09-13

## 2022-09-06 RX ORDER — ROSUVASTATIN CALCIUM 10 MG/1
10 TABLET, COATED ORAL DAILY
Qty: 90 TABLET | Refills: 1 | Status: SHIPPED | OUTPATIENT
Start: 2022-09-06 | End: 2023-01-24 | Stop reason: SDUPTHER

## 2022-09-13 RX ORDER — GLIMEPIRIDE 2 MG/1
TABLET ORAL
Qty: 270 TABLET | Refills: 0 | Status: SHIPPED | OUTPATIENT
Start: 2022-09-13 | End: 2023-02-03

## 2023-01-25 RX ORDER — ROSUVASTATIN CALCIUM 10 MG/1
10 TABLET, COATED ORAL DAILY
Qty: 90 TABLET | Refills: 1 | Status: SHIPPED | OUTPATIENT
Start: 2023-01-25 | End: 2023-01-30 | Stop reason: SDUPTHER

## 2023-01-25 RX ORDER — LISINOPRIL AND HYDROCHLOROTHIAZIDE 20; 12.5 MG/1; MG/1
1 TABLET ORAL DAILY
Qty: 90 TABLET | Refills: 1 | Status: SHIPPED | OUTPATIENT
Start: 2023-01-25 | End: 2023-01-30 | Stop reason: SDUPTHER

## 2023-01-25 RX ORDER — LEVOTHYROXINE SODIUM 0.05 MG/1
50 TABLET ORAL DAILY
Qty: 90 TABLET | Refills: 1 | Status: SHIPPED | OUTPATIENT
Start: 2023-01-25 | End: 2023-01-30 | Stop reason: SDUPTHER

## 2023-01-31 RX ORDER — LEVOTHYROXINE SODIUM 0.05 MG/1
50 TABLET ORAL DAILY
Qty: 90 TABLET | Refills: 1 | Status: SHIPPED | OUTPATIENT
Start: 2023-01-31

## 2023-01-31 RX ORDER — LISINOPRIL AND HYDROCHLOROTHIAZIDE 20; 12.5 MG/1; MG/1
1 TABLET ORAL DAILY
Qty: 90 TABLET | Refills: 1 | Status: SHIPPED | OUTPATIENT
Start: 2023-01-31

## 2023-01-31 RX ORDER — ROSUVASTATIN CALCIUM 10 MG/1
10 TABLET, COATED ORAL DAILY
Qty: 90 TABLET | Refills: 1 | Status: SHIPPED | OUTPATIENT
Start: 2023-01-31

## 2023-02-03 ENCOUNTER — OFFICE VISIT (OUTPATIENT)
Dept: FAMILY MEDICINE CLINIC | Facility: CLINIC | Age: 33
End: 2023-02-03
Payer: COMMERCIAL

## 2023-02-03 VITALS
DIASTOLIC BLOOD PRESSURE: 82 MMHG | HEART RATE: 77 BPM | BODY MASS INDEX: 40.23 KG/M2 | HEIGHT: 69 IN | WEIGHT: 271.6 LBS | SYSTOLIC BLOOD PRESSURE: 130 MMHG | OXYGEN SATURATION: 99 %

## 2023-02-03 DIAGNOSIS — E03.9 ACQUIRED HYPOTHYROIDISM: ICD-10-CM

## 2023-02-03 DIAGNOSIS — E11.9 TYPE 2 DIABETES MELLITUS WITHOUT COMPLICATION, WITHOUT LONG-TERM CURRENT USE OF INSULIN: Primary | ICD-10-CM

## 2023-02-03 DIAGNOSIS — E78.2 MIXED HYPERLIPIDEMIA: ICD-10-CM

## 2023-02-03 DIAGNOSIS — I10 PRIMARY HYPERTENSION: ICD-10-CM

## 2023-02-03 LAB — POC MICROALBUMIN URINE: 100

## 2023-02-03 PROCEDURE — 82044 UR ALBUMIN SEMIQUANTITATIVE: CPT | Performed by: FAMILY MEDICINE

## 2023-02-03 PROCEDURE — 36415 COLL VENOUS BLD VENIPUNCTURE: CPT | Performed by: FAMILY MEDICINE

## 2023-02-03 PROCEDURE — 99213 OFFICE O/P EST LOW 20 MIN: CPT | Performed by: FAMILY MEDICINE

## 2023-02-03 RX ORDER — GLIMEPIRIDE 4 MG/1
4 TABLET ORAL
Qty: 60 TABLET | Refills: 5 | Status: SHIPPED | OUTPATIENT
Start: 2023-02-03

## 2023-02-03 NOTE — PROGRESS NOTES
Chief Complaint  Diabetes, Hypertension, and Hyperlipidemia    Subjective          Lexx Reyna presents to [facility] MEDICAL [facility] PRIMARY [facility]ARE  History of Present Illness  She comes in today saying that he is blood sugars been running between 160 and 200 recently he says it is better than it has been but is still been up some he says he been trying to watch his diet try and exercise he has been had a interest in doing injectable such as Ozempic and other things however his co-pay is about $60 and he says he cannot afford that at this time he says that he would like to have his other medications adjusted and we can see how he does with that he states.  Diabetes  Hypoglycemia symptoms include dizziness, mood changes, nervousness/anxiousness, sleepiness and sweats. Associated symptoms include blurred vision, fatigue, foot paresthesias, polydipsia and polyuria. Symptoms are worsening. Risk factors for coronary artery disease include dyslipidemia, hypertension and obesity. Current diabetic treatment includes diet and oral agent (dual therapy). He is compliant with treatment most of the time. He is currently taking insulin pre-breakfast, pre-dinner and at bedtime. His weight is increasing steadily. He is following a generally healthy diet. Meal planning includes avoidance of concentrated sweets. He has not had a previous visit with a dietitian. He participates in exercise daily. He monitors blood glucose at home 1-2 x per week. Blood glucose monitoring compliance is good. His home blood glucose trend is fluctuating minimally. His breakfast blood glucose is taken after 10 am. His breakfast blood glucose range is generally >200 mg/dl. His lunch blood glucose is taken between 11-12 pm. His lunch blood glucose range is generally 180-200 mg/dl. His dinner blood glucose is taken between 6-7 pm. His dinner blood glucose range is generally 180-200 mg/dl. His highest blood glucose is 180-200 mg/dl. His overall blood  "glucose range is 180-200 mg/dl. He does not see a podiatrist.Eye exam is not current.   Hypertension  Associated symptoms include blurred vision and sweats. Pertinent negatives include no shortness of breath.   Hyperlipidemia  Pertinent negatives include no shortness of breath.       Objective   Vital Signs:   /82   Pulse 77   Ht 175.3 cm (69\")   Wt 123 kg (271 lb 9.6 oz)   SpO2 99%   BMI 40.11 kg/m²     Body mass index is 40.11 kg/m².    Review of Systems   Constitutional: Positive for fatigue.   HENT: Negative for congestion, dental problem, ear discharge, ear pain and sore throat.    Eyes: Positive for blurred vision.   Respiratory: Negative for apnea, chest tightness and shortness of breath.    Gastrointestinal: Negative for constipation and nausea.   Endocrine: Positive for polydipsia and polyuria.   Genitourinary: Negative for difficulty urinating.   Musculoskeletal: Negative for arthralgias and gait problem.   Skin: Negative for rash.   Neurological: Positive for dizziness.   Hematological: Negative for adenopathy.   Psychiatric/Behavioral: The patient is nervous/anxious.        Past History:  Medical History: has a past medical history of Diabetes mellitus (HCC), Hyperlipidemia, Hypertension, and Hyperthyroidism.   Surgical History: has no past surgical history on file.         Current Outpatient Medications:   •  glimepiride (Amaryl) 4 MG tablet, Take 1 tablet by mouth Every Morning Before Breakfast., Disp: 60 tablet, Rfl: 5  •  levothyroxine (SYNTHROID, LEVOTHROID) 50 MCG tablet, Take 1 tablet by mouth Daily., Disp: 90 tablet, Rfl: 1  •  lisinopril-hydrochlorothiazide (Zestoretic) 20-12.5 MG per tablet, Take 1 tablet by mouth Daily., Disp: 90 tablet, Rfl: 1  •  metFORMIN ER (GLUCOPHAGE-XR) 500 MG 24 hr tablet, Take 1 tablet by mouth 2 (Two) Times a Day With Meals., Disp: 360 tablet, Rfl: 1  •  rosuvastatin (Crestor) 10 MG tablet, Take 1 tablet by mouth Daily., Disp: 90 tablet, Rfl: " 1    Allergies: Patient has no known allergies.    Physical Exam  Vitals reviewed.   Constitutional:       Appearance: Normal appearance.   HENT:      Head: Normocephalic.      Right Ear: Tympanic membrane, ear canal and external ear normal.      Left Ear: Tympanic membrane, ear canal and external ear normal.      Nose: Nose normal.      Mouth/Throat:      Pharynx: Oropharynx is clear.   Eyes:      Pupils: Pupils are equal, round, and reactive to light.   Cardiovascular:      Rate and Rhythm: Normal rate and regular rhythm.      Pulses: Normal pulses.   Pulmonary:      Effort: Pulmonary effort is normal.      Breath sounds: Normal breath sounds.   Abdominal:      General: Abdomen is flat. Bowel sounds are normal.      Palpations: Abdomen is soft.   Musculoskeletal:         General: Normal range of motion.   Skin:     General: Skin is warm and dry.   Neurological:      General: No focal deficit present.      Mental Status: He is alert and oriented to person, place, and time.          Result Review :                   Assessment and Plan    Diagnoses and all orders for this visit:    1. Type 2 diabetes mellitus without complication, without long-term current use of insulin (HCC) (Primary)  Comments:  Sugars been running high discussed Ozempic he says he cannot afford the co-pays for those we will increase his Amaryl to 4 mg twice daily  Orders:  -     POCT microalbumin  -     Comprehensive Metabolic Panel; Future  -     Hemoglobin A1c; Future    2. Acquired hypothyroidism  Comments:  Check TSH borderline last time  Orders:  -     TSH; Future    3. Primary hypertension  Comments:  We will continue meds    4. Mixed hyperlipidemia  Comments:  Filled medications    Other orders  -     glimepiride (Amaryl) 4 MG tablet; Take 1 tablet by mouth Every Morning Before Breakfast.  Dispense: 60 tablet; Refill: 5              Follow Up   Return in about 3 months (around 5/3/2023).  Patient was given instructions and counseling  regarding his condition or for health maintenance advice. Please see specific information pulled into the AVS if appropriate.     Ruben Lee MD  Answers for HPI/ROS submitted by the patient on 2/1/2023  What is the primary reason for your visit?: Diabetes

## 2023-02-04 LAB
ALBUMIN SERPL-MCNC: 4.9 G/DL (ref 4–5)
ALBUMIN/GLOB SERPL: 2 {RATIO} (ref 1.2–2.2)
ALP SERPL-CCNC: 74 IU/L (ref 44–121)
ALT SERPL-CCNC: 52 IU/L (ref 0–44)
AST SERPL-CCNC: 33 IU/L (ref 0–40)
BILIRUB SERPL-MCNC: 0.7 MG/DL (ref 0–1.2)
BUN SERPL-MCNC: 22 MG/DL (ref 6–20)
BUN/CREAT SERPL: 17 (ref 9–20)
CALCIUM SERPL-MCNC: 9.7 MG/DL (ref 8.7–10.2)
CHLORIDE SERPL-SCNC: 102 MMOL/L (ref 96–106)
CO2 SERPL-SCNC: 23 MMOL/L (ref 20–29)
CREAT SERPL-MCNC: 1.33 MG/DL (ref 0.76–1.27)
EGFRCR SERPLBLD CKD-EPI 2021: 73 ML/MIN/1.73
GLOBULIN SER CALC-MCNC: 2.5 G/DL (ref 1.5–4.5)
GLUCOSE SERPL-MCNC: 192 MG/DL (ref 70–99)
HBA1C MFR BLD: 9.1 % (ref 4.8–5.6)
POTASSIUM SERPL-SCNC: 4.5 MMOL/L (ref 3.5–5.2)
PROT SERPL-MCNC: 7.4 G/DL (ref 6–8.5)
SODIUM SERPL-SCNC: 140 MMOL/L (ref 134–144)
TSH SERPL DL<=0.005 MIU/L-ACNC: 4.06 UIU/ML (ref 0.45–4.5)

## 2023-05-05 RX ORDER — METFORMIN HYDROCHLORIDE 500 MG/1
TABLET, EXTENDED RELEASE ORAL
Qty: 360 TABLET | Refills: 0 | Status: SHIPPED | OUTPATIENT
Start: 2023-05-05

## 2023-07-31 RX ORDER — LEVOTHYROXINE SODIUM 0.05 MG/1
TABLET ORAL
Qty: 90 TABLET | Refills: 0 | Status: SHIPPED | OUTPATIENT
Start: 2023-07-31 | End: 2023-07-31 | Stop reason: SDUPTHER

## 2023-07-31 RX ORDER — LISINOPRIL AND HYDROCHLOROTHIAZIDE 20; 12.5 MG/1; MG/1
TABLET ORAL
Qty: 90 TABLET | Refills: 0 | Status: SHIPPED | OUTPATIENT
Start: 2023-07-31 | End: 2023-07-31 | Stop reason: SDUPTHER

## 2023-08-01 RX ORDER — LISINOPRIL AND HYDROCHLOROTHIAZIDE 20; 12.5 MG/1; MG/1
1 TABLET ORAL DAILY
Qty: 60 TABLET | Refills: 0 | Status: SHIPPED | OUTPATIENT
Start: 2023-08-01

## 2023-08-01 RX ORDER — METFORMIN HYDROCHLORIDE 500 MG/1
500 TABLET, EXTENDED RELEASE ORAL 2 TIMES DAILY WITH MEALS
Qty: 120 TABLET | Refills: 0 | Status: SHIPPED | OUTPATIENT
Start: 2023-08-01

## 2023-08-01 RX ORDER — LEVOTHYROXINE SODIUM 0.05 MG/1
50 TABLET ORAL DAILY
Qty: 60 TABLET | Refills: 0 | Status: SHIPPED | OUTPATIENT
Start: 2023-08-01

## 2023-11-08 ENCOUNTER — FLU SHOT (OUTPATIENT)
Dept: FAMILY MEDICINE CLINIC | Facility: CLINIC | Age: 33
End: 2023-11-08
Payer: COMMERCIAL

## 2023-11-08 DIAGNOSIS — Z23 FLU VACCINE NEED: Primary | ICD-10-CM

## 2023-11-08 RX ORDER — LEVOTHYROXINE SODIUM 0.05 MG/1
50 TABLET ORAL DAILY
Qty: 60 TABLET | Refills: 0 | Status: SHIPPED | OUTPATIENT
Start: 2023-11-08

## 2023-11-08 RX ORDER — METFORMIN HYDROCHLORIDE 500 MG/1
500 TABLET, EXTENDED RELEASE ORAL 2 TIMES DAILY WITH MEALS
Qty: 120 TABLET | Refills: 0 | Status: SHIPPED | OUTPATIENT
Start: 2023-11-08

## 2023-11-08 RX ORDER — LISINOPRIL AND HYDROCHLOROTHIAZIDE 20; 12.5 MG/1; MG/1
1 TABLET ORAL DAILY
Qty: 60 TABLET | Refills: 0 | Status: SHIPPED | OUTPATIENT
Start: 2023-11-08

## 2023-11-08 RX ORDER — ROSUVASTATIN CALCIUM 10 MG/1
10 TABLET, COATED ORAL DAILY
Qty: 60 TABLET | Refills: 0 | Status: SHIPPED | OUTPATIENT
Start: 2023-11-08

## 2023-12-06 RX ORDER — METFORMIN HYDROCHLORIDE 500 MG/1
500 TABLET, EXTENDED RELEASE ORAL 2 TIMES DAILY WITH MEALS
Qty: 120 TABLET | Refills: 0 | Status: SHIPPED | OUTPATIENT
Start: 2023-12-06

## 2023-12-06 RX ORDER — LISINOPRIL AND HYDROCHLOROTHIAZIDE 20; 12.5 MG/1; MG/1
1 TABLET ORAL DAILY
Qty: 60 TABLET | Refills: 0 | Status: SHIPPED | OUTPATIENT
Start: 2023-12-06

## 2023-12-06 RX ORDER — LEVOTHYROXINE SODIUM 0.05 MG/1
50 TABLET ORAL DAILY
Qty: 60 TABLET | Refills: 0 | Status: SHIPPED | OUTPATIENT
Start: 2023-12-06

## 2023-12-06 RX ORDER — ROSUVASTATIN CALCIUM 10 MG/1
10 TABLET, COATED ORAL DAILY
Qty: 60 TABLET | Refills: 0 | Status: SHIPPED | OUTPATIENT
Start: 2023-12-06

## 2023-12-15 ENCOUNTER — OFFICE VISIT (OUTPATIENT)
Dept: FAMILY MEDICINE CLINIC | Facility: CLINIC | Age: 33
End: 2023-12-15
Payer: COMMERCIAL

## 2023-12-15 VITALS
HEIGHT: 69 IN | BODY MASS INDEX: 38.8 KG/M2 | SYSTOLIC BLOOD PRESSURE: 142 MMHG | TEMPERATURE: 98.6 F | HEART RATE: 90 BPM | DIASTOLIC BLOOD PRESSURE: 92 MMHG | OXYGEN SATURATION: 98 % | WEIGHT: 262 LBS

## 2023-12-15 DIAGNOSIS — E78.2 MIXED HYPERLIPIDEMIA: ICD-10-CM

## 2023-12-15 DIAGNOSIS — I10 PRIMARY HYPERTENSION: ICD-10-CM

## 2023-12-15 DIAGNOSIS — E66.01 MORBID OBESITY: ICD-10-CM

## 2023-12-15 DIAGNOSIS — E11.65 TYPE 2 DIABETES MELLITUS WITH HYPERGLYCEMIA, WITHOUT LONG-TERM CURRENT USE OF INSULIN: Primary | ICD-10-CM

## 2023-12-15 DIAGNOSIS — K21.9 GASTROESOPHAGEAL REFLUX DISEASE, UNSPECIFIED WHETHER ESOPHAGITIS PRESENT: ICD-10-CM

## 2023-12-15 DIAGNOSIS — E03.9 ACQUIRED HYPOTHYROIDISM: ICD-10-CM

## 2023-12-15 PROCEDURE — 36415 COLL VENOUS BLD VENIPUNCTURE: CPT | Performed by: FAMILY MEDICINE

## 2023-12-15 RX ORDER — OMEPRAZOLE 40 MG/1
40 CAPSULE, DELAYED RELEASE ORAL DAILY
Qty: 90 CAPSULE | Refills: 1 | Status: SHIPPED | OUTPATIENT
Start: 2023-12-15

## 2023-12-15 NOTE — PROGRESS NOTES
Date: 12/15/2023   Patient Name: Lexx Reyna  : 1990   MRN: [id number]     Chief Complaint:    Chief Complaint   Patient presents with    Establish Care     With med management and possible labs       History of Present Illness: Lexx Reyna is a 33 y.o. male who is here today to follow up for diabetes, hyperlipidemia, hypothyroidism, and hypertension.  Patient's most recent A1c was 9.1 last checked about 1 year ago.  He is currently only taking metformin and Amaryl at this time.  He reports he was told by his previous PCP that he would not be able to get any medications in the GLP-1 class despite having Medicaid.    He also reports uncontrolled acid reflux for which she is only taking over-the-counter Tums as needed.         Review of Systems:   Review of Systems   Constitutional:  Negative for fatigue.   Eyes:  Negative for blurred vision.   Respiratory:  Negative for cough, chest tightness and shortness of breath.    Cardiovascular:  Negative for chest pain, palpitations and leg swelling.   Gastrointestinal:  Positive for GERD and indigestion. Negative for abdominal pain, constipation, diarrhea, nausea and vomiting.   Neurological:  Negative for dizziness, tremors and headache.   Psychiatric/Behavioral:  Negative for depressed mood. The patient is not nervous/anxious.        Past Medical History:   Past Medical History:   Diagnosis Date    Diabetes mellitus     Hyperlipidemia     Hypertension     Hyperthyroidism        Past Surgical History: History reviewed. No pertinent surgical history.    Family History:   Family History   Problem Relation Age of Onset    Cancer Mother     Kidney disease Father        Social History:   Social History     Socioeconomic History    Marital status: Single   Tobacco Use    Smoking status: Never    Smokeless tobacco: Never   Vaping Use    Vaping Use: Never used   Substance and Sexual Activity    Alcohol use: No    Drug use: No    Sexual activity: Defer  "      Medications:     Current Outpatient Medications:     levothyroxine (SYNTHROID, LEVOTHROID) 50 MCG tablet, Take 1 tablet by mouth Daily., Disp: 60 tablet, Rfl: 0    lisinopril-hydrochlorothiazide (PRINZIDE,ZESTORETIC) 20-12.5 MG per tablet, Take 1 tablet by mouth Daily., Disp: 60 tablet, Rfl: 0    rosuvastatin (Crestor) 10 MG tablet, Take 1 tablet by mouth Daily., Disp: 60 tablet, Rfl: 0    omeprazole (priLOSEC) 40 MG capsule, Take 1 capsule by mouth Daily., Disp: 90 capsule, Rfl: 1    Tirzepatide (Mounjaro) 5 MG/0.5ML solution pen-injector, Inject 0.5 mL under the skin into the appropriate area as directed 1 (One) Time Per Week., Disp: 2 mL, Rfl: 0    Tirzepatide (Mounjaro) 7.5 MG/0.5ML solution pen-injector, Inject 0.5 mL under the skin into the appropriate area as directed 1 (One) Time Per Week., Disp: 2 mL, Rfl: 1    Allergies:   No Known Allergies    PHQ-2 Total Score:     PHQ-9 Total Score:       Physical Exam:  Vital Signs:   Vitals:    12/15/23 0941   BP: 142/92   Pulse: 90   Temp: 98.6 °F (37 °C)   TempSrc: Oral   SpO2: 98%   Weight: 119 kg (262 lb)   Height: 175.3 cm (69\")     Body mass index is 38.69 kg/m².     Physical Exam  Vitals and nursing note reviewed.   Constitutional:       Appearance: Normal appearance. He is obese.   Cardiovascular:      Rate and Rhythm: Normal rate and regular rhythm.      Heart sounds: Normal heart sounds. No murmur heard.  Pulmonary:      Effort: Pulmonary effort is normal.      Breath sounds: Normal breath sounds. No wheezing.   Neurological:      Mental Status: He is alert and oriented to person, place, and time. Mental status is at baseline.   Psychiatric:         Mood and Affect: Mood normal.         Behavior: Behavior normal.           Assessment/Plan:   Diagnoses and all orders for this visit:    1. Type 2 diabetes mellitus with hyperglycemia, without long-term current use of insulin (Primary)  Assessment & Plan:  Diabetes is unchanged.   Dietary recommendations " for ADA diet.  Discussed ways to avoid symptomatic hypoglycemia.  Patient will discontinue metformin and Amaryl and start Mounjaro and Jardiance.  Side effects discussed, patient shown how to use the Mounjaro pen and we discussed storage and dosing as well.  Diabetes will be reassessed in 1 month.    Orders:  -     Hemoglobin A1c; Future  -     Tirzepatide (Mounjaro) 5 MG/0.5ML solution pen-injector; Inject 0.5 mL under the skin into the appropriate area as directed 1 (One) Time Per Week.  Dispense: 2 mL; Refill: 0  -     Tirzepatide (Mounjaro) 7.5 MG/0.5ML solution pen-injector; Inject 0.5 mL under the skin into the appropriate area as directed 1 (One) Time Per Week.  Dispense: 2 mL; Refill: 1  -     Hemoglobin A1c    2. Mixed hyperlipidemia  Assessment & Plan:  Lipid abnormalities are unchanged.  Nutritional counseling was provided. and Pharmacotherapy as ordered.  Lipids will be reassessed in 3 months.    Orders:  -     Lipid Panel; Future  -     Lipid Panel    3. Acquired hypothyroidism  Assessment & Plan:  Stable, labs today    Orders:  -     TSH; Future  -     TSH    4. Primary hypertension  Assessment & Plan:  Hypertension is  stable .  Continue current treatment regimen.  Weight loss.  Regular aerobic exercise.  Blood pressure will be reassessed at the next regular appointment.    Orders:  -     CBC Auto Differential; Future  -     Comprehensive Metabolic Panel; Future  -     CBC Auto Differential  -     Comprehensive Metabolic Panel    5. Gastroesophageal reflux disease, unspecified whether esophagitis present  Assessment & Plan:  Rx omeprazole 40 mg daily    Orders:  -     omeprazole (priLOSEC) 40 MG capsule; Take 1 capsule by mouth Daily.  Dispense: 90 capsule; Refill: 1    6. Morbid obesity  Assessment & Plan:  Patient's (Body mass index is 38.69 kg/m².) indicates that they are morbidly/severely obese (BMI > 40 or > 35 with obesity - related health condition) with health conditions that include  hypertension, diabetes mellitus, dyslipidemias, and GERD . Weight is newly identified. BMI  is above average; BMI management plan is completed. We discussed portion control and increasing exercise.              Follow Up:   Return in about 8 weeks (around 2/9/2024).    Marilee Saucedo DO  [facility]

## 2023-12-16 LAB
ALBUMIN SERPL-MCNC: 4.8 G/DL (ref 4.1–5.1)
ALBUMIN/GLOB SERPL: 1.6 {RATIO} (ref 1.2–2.2)
ALP SERPL-CCNC: 138 IU/L (ref 44–121)
ALT SERPL-CCNC: 77 IU/L (ref 0–44)
AST SERPL-CCNC: 36 IU/L (ref 0–40)
BASOPHILS # BLD AUTO: 0.1 X10E3/UL (ref 0–0.2)
BASOPHILS NFR BLD AUTO: 1 %
BILIRUB SERPL-MCNC: 0.3 MG/DL (ref 0–1.2)
BUN SERPL-MCNC: 16 MG/DL (ref 6–20)
BUN/CREAT SERPL: 14 (ref 9–20)
CALCIUM SERPL-MCNC: 9.9 MG/DL (ref 8.7–10.2)
CHLORIDE SERPL-SCNC: 99 MMOL/L (ref 96–106)
CHOLEST SERPL-MCNC: 146 MG/DL (ref 100–199)
CO2 SERPL-SCNC: 21 MMOL/L (ref 20–29)
CREAT SERPL-MCNC: 1.13 MG/DL (ref 0.76–1.27)
EGFRCR SERPLBLD CKD-EPI 2021: 89 ML/MIN/1.73
EOSINOPHIL # BLD AUTO: 0.2 X10E3/UL (ref 0–0.4)
EOSINOPHIL NFR BLD AUTO: 3 %
ERYTHROCYTE [DISTWIDTH] IN BLOOD BY AUTOMATED COUNT: 12.8 % (ref 11.6–15.4)
GLOBULIN SER CALC-MCNC: 3 G/DL (ref 1.5–4.5)
GLUCOSE SERPL-MCNC: 168 MG/DL (ref 70–99)
HBA1C MFR BLD: 8.6 % (ref 4.8–5.6)
HCT VFR BLD AUTO: 49.3 % (ref 37.5–51)
HDLC SERPL-MCNC: 34 MG/DL
HGB BLD-MCNC: 16.6 G/DL (ref 13–17.7)
IMM GRANULOCYTES # BLD AUTO: 0.1 X10E3/UL (ref 0–0.1)
IMM GRANULOCYTES NFR BLD AUTO: 1 %
LDLC SERPL CALC-MCNC: 69 MG/DL (ref 0–99)
LYMPHOCYTES # BLD AUTO: 2.4 X10E3/UL (ref 0.7–3.1)
LYMPHOCYTES NFR BLD AUTO: 27 %
MCH RBC QN AUTO: 30.4 PG (ref 26.6–33)
MCHC RBC AUTO-ENTMCNC: 33.7 G/DL (ref 31.5–35.7)
MCV RBC AUTO: 90 FL (ref 79–97)
MONOCYTES # BLD AUTO: 1.1 X10E3/UL (ref 0.1–0.9)
MONOCYTES NFR BLD AUTO: 13 %
NEUTROPHILS # BLD AUTO: 5 X10E3/UL (ref 1.4–7)
NEUTROPHILS NFR BLD AUTO: 55 %
PLATELET # BLD AUTO: 321 X10E3/UL (ref 150–450)
POTASSIUM SERPL-SCNC: 4.6 MMOL/L (ref 3.5–5.2)
PROT SERPL-MCNC: 7.8 G/DL (ref 6–8.5)
RBC # BLD AUTO: 5.46 X10E6/UL (ref 4.14–5.8)
SODIUM SERPL-SCNC: 139 MMOL/L (ref 134–144)
TRIGL SERPL-MCNC: 264 MG/DL (ref 0–149)
TSH SERPL DL<=0.005 MIU/L-ACNC: 3.83 UIU/ML (ref 0.45–4.5)
VLDLC SERPL CALC-MCNC: 43 MG/DL (ref 5–40)
WBC # BLD AUTO: 8.8 X10E3/UL (ref 3.4–10.8)

## 2023-12-31 PROBLEM — K21.9 GASTROESOPHAGEAL REFLUX DISEASE: Status: ACTIVE | Noted: 2023-12-31

## 2023-12-31 PROBLEM — E66.01 MORBID OBESITY: Status: ACTIVE | Noted: 2023-12-31

## 2024-01-01 NOTE — ASSESSMENT & PLAN NOTE
Hypertension is  stable .  Continue current treatment regimen.  Weight loss.  Regular aerobic exercise.  Blood pressure will be reassessed at the next regular appointment.

## 2024-01-01 NOTE — ASSESSMENT & PLAN NOTE
Diabetes is unchanged.   Dietary recommendations for ADA diet.  Discussed ways to avoid symptomatic hypoglycemia.  Patient will discontinue metformin and Amaryl and start Mounjaro and Jardiance.  Side effects discussed, patient shown how to use the Mounjaro pen and we discussed storage and dosing as well.  Diabetes will be reassessed in 1 month.

## 2024-01-01 NOTE — ASSESSMENT & PLAN NOTE
Patient's (Body mass index is 38.69 kg/m².) indicates that they are morbidly/severely obese (BMI > 40 or > 35 with obesity - related health condition) with health conditions that include hypertension, diabetes mellitus, dyslipidemias, and GERD . Weight is newly identified. BMI  is above average; BMI management plan is completed. We discussed portion control and increasing exercise.

## 2024-01-09 RX ORDER — LISINOPRIL AND HYDROCHLOROTHIAZIDE 20; 12.5 MG/1; MG/1
1 TABLET ORAL DAILY
Qty: 60 TABLET | Refills: 0 | Status: SHIPPED | OUTPATIENT
Start: 2024-01-09

## 2024-01-09 RX ORDER — LEVOTHYROXINE SODIUM 0.05 MG/1
50 TABLET ORAL DAILY
Qty: 60 TABLET | Refills: 0 | Status: SHIPPED | OUTPATIENT
Start: 2024-01-09

## 2024-01-09 RX ORDER — ROSUVASTATIN CALCIUM 10 MG/1
10 TABLET, COATED ORAL DAILY
Qty: 60 TABLET | Refills: 0 | Status: SHIPPED | OUTPATIENT
Start: 2024-01-09

## 2024-02-13 ENCOUNTER — OFFICE VISIT (OUTPATIENT)
Dept: FAMILY MEDICINE CLINIC | Facility: CLINIC | Age: 34
End: 2024-02-13
Payer: COMMERCIAL

## 2024-02-13 VITALS
OXYGEN SATURATION: 98 % | HEIGHT: 69 IN | SYSTOLIC BLOOD PRESSURE: 136 MMHG | BODY MASS INDEX: 35.81 KG/M2 | DIASTOLIC BLOOD PRESSURE: 94 MMHG | WEIGHT: 241.8 LBS | HEART RATE: 83 BPM

## 2024-02-13 DIAGNOSIS — I10 PRIMARY HYPERTENSION: ICD-10-CM

## 2024-02-13 DIAGNOSIS — E11.65 TYPE 2 DIABETES MELLITUS WITH HYPERGLYCEMIA, WITHOUT LONG-TERM CURRENT USE OF INSULIN: Primary | ICD-10-CM

## 2024-02-13 DIAGNOSIS — E78.2 MIXED HYPERLIPIDEMIA: ICD-10-CM

## 2024-02-13 LAB
EXPIRATION DATE: ABNORMAL
EXPIRATION DATE: ABNORMAL
HBA1C MFR BLD: 7.1 % (ref 4.5–5.7)
Lab: ABNORMAL
Lab: ABNORMAL
POC CREATININE URINE: 300
POC MICROALBUMIN URINE: 150

## 2024-02-13 PROCEDURE — 83036 HEMOGLOBIN GLYCOSYLATED A1C: CPT | Performed by: FAMILY MEDICINE

## 2024-02-13 PROCEDURE — 82044 UR ALBUMIN SEMIQUANTITATIVE: CPT | Performed by: FAMILY MEDICINE

## 2024-02-13 PROCEDURE — 99214 OFFICE O/P EST MOD 30 MIN: CPT | Performed by: FAMILY MEDICINE

## 2024-02-13 RX ORDER — LISINOPRIL AND HYDROCHLOROTHIAZIDE 20; 12.5 MG/1; MG/1
1 TABLET ORAL DAILY
Qty: 90 TABLET | Refills: 1 | Status: SHIPPED | OUTPATIENT
Start: 2024-02-13

## 2024-02-13 RX ORDER — LEVOTHYROXINE SODIUM 0.05 MG/1
50 TABLET ORAL DAILY
Qty: 90 TABLET | Refills: 1 | Status: SHIPPED | OUTPATIENT
Start: 2024-02-13

## 2024-02-13 RX ORDER — ROSUVASTATIN CALCIUM 10 MG/1
10 TABLET, COATED ORAL DAILY
Qty: 90 TABLET | Refills: 1 | Status: SHIPPED | OUTPATIENT
Start: 2024-02-13

## 2024-02-13 NOTE — PROGRESS NOTES
Date: 2024   Patient Name: Lexx Reyna  : 1990   MRN: [id number]     Chief Complaint:    Chief Complaint   Patient presents with    Diabetes       History of Present Illness: Lexx Reyna is a 33 y.o. male who is here today to follow up for diabetes, HLD, and HTN. POC A1c in office today is 7.1 and pt would like to increase Mounjaro to 10mg.    Reports BP is well controlled and he is tolerating statin well.          Review of Systems:   Review of Systems   Constitutional:  Negative for unexpected weight loss.   Eyes:  Positive for blurred vision.   Endocrine: Positive for polydipsia and polyuria.   Neurological:  Negative for tremors, speech difficulty and confusion.       Past Medical History:   Past Medical History:   Diagnosis Date    Allergic     Basic summer and spring allergies    Diabetes mellitus     Hyperlipidemia     Hypertension     Hyperthyroidism        Past Surgical History: History reviewed. No pertinent surgical history.    Family History:   Family History   Problem Relation Age of Onset    Cancer Mother     Kidney disease Father        Social History:   Social History     Socioeconomic History    Marital status: Single   Tobacco Use    Smoking status: Never    Smokeless tobacco: Never   Vaping Use    Vaping status: Never Used   Substance and Sexual Activity    Alcohol use: No    Drug use: No    Sexual activity: Defer       Medications:     Current Outpatient Medications:     levothyroxine (SYNTHROID, LEVOTHROID) 50 MCG tablet, Take 1 tablet by mouth Daily., Disp: 90 tablet, Rfl: 1    lisinopril-hydrochlorothiazide (PRINZIDE,ZESTORETIC) 20-12.5 MG per tablet, Take 1 tablet by mouth Daily., Disp: 90 tablet, Rfl: 1    omeprazole (priLOSEC) 40 MG capsule, Take 1 capsule by mouth Daily., Disp: 90 capsule, Rfl: 1    rosuvastatin (CRESTOR) 10 MG tablet, Take 1 tablet by mouth Daily., Disp: 90 tablet, Rfl: 1    Tirzepatide (Mounjaro) 7.5 MG/0.5ML solution pen-injector,  "Inject 0.5 mL under the skin into the appropriate area as directed 1 (One) Time Per Week., Disp: 2 mL, Rfl: 1    Tirzepatide (MOUNJARO) 10 MG/0.5ML solution pen-injector pen, Inject 0.5 mL under the skin into the appropriate area as directed 1 (One) Time Per Week., Disp: 2 mL, Rfl: 5    Allergies:   No Known Allergies    PHQ-2 Total Score: 0   PHQ-9 Total Score: 0     Physical Exam:  Vital Signs:   Vitals:    02/13/24 0903   BP: 136/94   BP Location: Left arm   Patient Position: Sitting   Cuff Size: Large Adult   Pulse: 83   SpO2: 98%   Weight: 110 kg (241 lb 12.8 oz)   Height: 175.3 cm (69\")     Body mass index is 35.71 kg/m².     Physical Exam  Vitals and nursing note reviewed.   Constitutional:       Appearance: Normal appearance. He is normal weight.   HENT:      Head: Normocephalic and atraumatic.   Cardiovascular:      Rate and Rhythm: Normal rate and regular rhythm.      Heart sounds: Normal heart sounds. No murmur heard.  Pulmonary:      Effort: Pulmonary effort is normal.      Breath sounds: Normal breath sounds. No wheezing.   Abdominal:      General: Bowel sounds are normal.      Palpations: Abdomen is soft.   Skin:     General: Skin is warm.   Neurological:      Mental Status: He is alert and oriented to person, place, and time. Mental status is at baseline.   Psychiatric:         Mood and Affect: Mood normal.         Behavior: Behavior normal.           Assessment/Plan:   Diagnoses and all orders for this visit:    1. Type 2 diabetes mellitus with hyperglycemia, without long-term current use of insulin (Primary)  Assessment & Plan:  Diabetes is improving with treatment.   Medication changes per orders.  Recommended an ADA diet.  Regular aerobic exercise.  Diabetes will be reassessed in 3 months    Orders:  -     POCT microalbumin  -     Tirzepatide (MOUNJARO) 10 MG/0.5ML solution pen-injector pen; Inject 0.5 mL under the skin into the appropriate area as directed 1 (One) Time Per Week.  Dispense: 2 mL; " Refill: 5  -     POC Glycosylated Hemoglobin (Hb A1C)    2. Mixed hyperlipidemia  Assessment & Plan:   Lipid abnormalities are stable    Plan:  Continue same medication/s without change.      Discussed medication dosage, use, side effects, and goals of treatment in detail.    Counseled patient on lifestyle modifications to help control hyperlipidemia.     Patient Treatment Goals:   LDL goal is less than 70    Followup in 6 months.    Orders:  -     rosuvastatin (CRESTOR) 10 MG tablet; Take 1 tablet by mouth Daily.  Dispense: 90 tablet; Refill: 1    3. Primary hypertension  Assessment & Plan:  Hypertension is stable and controlled  Continue current treatment regimen.  Blood pressure will be reassessed in 6 months.    Orders:  -     lisinopril-hydrochlorothiazide (PRINZIDE,ZESTORETIC) 20-12.5 MG per tablet; Take 1 tablet by mouth Daily.  Dispense: 90 tablet; Refill: 1    Other orders  -     levothyroxine (SYNTHROID, LEVOTHROID) 50 MCG tablet; Take 1 tablet by mouth Daily.  Dispense: 90 tablet; Refill: 1           Follow Up:   Return in about 3 months (around 5/13/2024) for Med Recheck.    Marilee Saucedo DO  [facility]

## 2024-03-04 NOTE — ASSESSMENT & PLAN NOTE
Diabetes is improving with treatment.   Medication changes per orders.  Recommended an ADA diet.  Regular aerobic exercise.  Diabetes will be reassessed in 3 months

## 2024-03-09 ENCOUNTER — TELEPHONE (OUTPATIENT)
Dept: FAMILY MEDICINE CLINIC | Facility: CLINIC | Age: 34
End: 2024-03-09
Payer: COMMERCIAL

## 2024-03-09 DIAGNOSIS — E11.65 TYPE 2 DIABETES MELLITUS WITH HYPERGLYCEMIA, WITHOUT LONG-TERM CURRENT USE OF INSULIN: ICD-10-CM

## 2024-03-12 ENCOUNTER — TELEPHONE (OUTPATIENT)
Dept: FAMILY MEDICINE CLINIC | Facility: CLINIC | Age: 34
End: 2024-03-12
Payer: COMMERCIAL

## 2024-03-12 NOTE — TELEPHONE ENCOUNTER
Patient states that [facility] is out of Mounro.    He called [facility] and they have one box so he would like you to send the script there please.

## 2024-03-17 DIAGNOSIS — E11.65 TYPE 2 DIABETES MELLITUS WITH HYPERGLYCEMIA, WITHOUT LONG-TERM CURRENT USE OF INSULIN: ICD-10-CM

## 2024-05-13 ENCOUNTER — OFFICE VISIT (OUTPATIENT)
Dept: FAMILY MEDICINE CLINIC | Facility: CLINIC | Age: 34
End: 2024-05-13
Payer: COMMERCIAL

## 2024-05-13 VITALS
HEART RATE: 93 BPM | SYSTOLIC BLOOD PRESSURE: 126 MMHG | BODY MASS INDEX: 33.5 KG/M2 | WEIGHT: 226.2 LBS | DIASTOLIC BLOOD PRESSURE: 88 MMHG | HEIGHT: 69 IN | OXYGEN SATURATION: 97 %

## 2024-05-13 DIAGNOSIS — E11.65 TYPE 2 DIABETES MELLITUS WITH HYPERGLYCEMIA, WITHOUT LONG-TERM CURRENT USE OF INSULIN: ICD-10-CM

## 2024-05-13 DIAGNOSIS — E03.9 ACQUIRED HYPOTHYROIDISM: ICD-10-CM

## 2024-05-13 DIAGNOSIS — E78.2 MIXED HYPERLIPIDEMIA: ICD-10-CM

## 2024-05-13 DIAGNOSIS — J30.1 SEASONAL ALLERGIC RHINITIS DUE TO POLLEN: ICD-10-CM

## 2024-05-13 DIAGNOSIS — I10 PRIMARY HYPERTENSION: ICD-10-CM

## 2024-05-13 DIAGNOSIS — Z00.00 ENCOUNTER FOR WELLNESS EXAMINATION IN ADULT: Primary | ICD-10-CM

## 2024-05-13 PROCEDURE — 99395 PREV VISIT EST AGE 18-39: CPT | Performed by: FAMILY MEDICINE

## 2024-05-13 PROCEDURE — 36415 COLL VENOUS BLD VENIPUNCTURE: CPT | Performed by: FAMILY MEDICINE

## 2024-05-13 RX ORDER — LISINOPRIL 20 MG/1
20 TABLET ORAL DAILY
Qty: 90 TABLET | Refills: 1 | Status: SHIPPED | OUTPATIENT
Start: 2024-05-13

## 2024-05-13 RX ORDER — FEXOFENADINE HCL 180 MG/1
180 TABLET ORAL DAILY
Qty: 90 TABLET | Refills: 1 | Status: SHIPPED | OUTPATIENT
Start: 2024-05-13

## 2024-05-13 RX ORDER — FLUTICASONE PROPIONATE 50 MCG
2 SPRAY, SUSPENSION (ML) NASAL DAILY
Qty: 18.2 ML | Refills: 5 | Status: SHIPPED | OUTPATIENT
Start: 2024-05-13

## 2024-05-13 NOTE — PROGRESS NOTES
Date: 2024   Patient Name: Lexx Reyna  : 1990   MRN: [id number]     Chief Complaint:    Chief Complaint   Patient presents with    Diabetes    Hypertension       History of Present Illness: Lexx Reyna is a 33 y.o. male who is here today to follow up for diabetes, hyperlipidemia, hypothyroidism, and hypertension.  He has lost over 60 pounds since starting Mounjaro.  He has since discontinued the injection due to cost and inability to find the product.  He reports sugars are ranging 100-150 at the max.  He is feeling much better physically and is due for labs today.  He has been struggling with allergies recently, not currently taking any medication for this.         Review of Systems:   Review of Systems   Constitutional:  Positive for unexpected weight loss. Negative for fatigue.   HENT:  Positive for congestion, postnasal drip and sneezing.    Eyes:  Positive for itching. Negative for blurred vision.   Respiratory:  Negative for cough, chest tightness and shortness of breath.    Cardiovascular:  Negative for chest pain, palpitations and leg swelling.   Gastrointestinal:  Negative for abdominal pain, constipation, diarrhea, nausea and vomiting.   Neurological:  Negative for dizziness, tremors and headache.   Psychiatric/Behavioral:  Negative for depressed mood. The patient is nervous/anxious.        Past Medical History:   Past Medical History:   Diagnosis Date    Allergic     Basic summer and spring allergies    Diabetes mellitus     Hyperlipidemia     Hypertension     Hyperthyroidism        Past Surgical History: History reviewed. No pertinent surgical history.    Family History:   Family History   Problem Relation Age of Onset    Cancer Mother     Kidney disease Father        Social History:   Social History     Socioeconomic History    Marital status: Single   Tobacco Use    Smoking status: Never    Smokeless tobacco: Never   Vaping Use    Vaping status: Never Used   Substance  "and Sexual Activity    Alcohol use: No    Drug use: No    Sexual activity: Defer       Medications:     Current Outpatient Medications:     levothyroxine (SYNTHROID, LEVOTHROID) 50 MCG tablet, Take 1 tablet by mouth Daily., Disp: 90 tablet, Rfl: 1    omeprazole (priLOSEC) 40 MG capsule, Take 1 capsule by mouth Daily., Disp: 90 capsule, Rfl: 1    fexofenadine (Allegra Allergy) 180 MG tablet, Take 1 tablet by mouth Daily., Disp: 90 tablet, Rfl: 1    fluticasone (FLONASE) 50 MCG/ACT nasal spray, 2 sprays into the nostril(s) as directed by provider Daily., Disp: 18.2 mL, Rfl: 5    lisinopril (PRINIVIL,ZESTRIL) 20 MG tablet, Take 1 tablet by mouth Daily., Disp: 90 tablet, Rfl: 1    Allergies:   No Known Allergies    PHQ-2 Total Score:     PHQ-9 Total Score:       Physical Exam:  Vital Signs:   Vitals:    05/13/24 0901   BP: 126/88   BP Location: Left arm   Patient Position: Sitting   Cuff Size: Large Adult   Pulse: 93   SpO2: 97%   Weight: 103 kg (226 lb 3.2 oz)   Height: 175.3 cm (69\")     Body mass index is 33.4 kg/m².     Physical Exam  Vitals and nursing note reviewed.   Constitutional:       Appearance: Normal appearance. He is obese.   Cardiovascular:      Rate and Rhythm: Normal rate and regular rhythm.      Heart sounds: Normal heart sounds. No murmur heard.  Pulmonary:      Effort: Pulmonary effort is normal.      Breath sounds: Normal breath sounds. No wheezing.   Neurological:      Mental Status: He is alert and oriented to person, place, and time. Mental status is at baseline.   Psychiatric:         Mood and Affect: Mood normal.         Behavior: Behavior normal.           Assessment/Plan:   Diagnoses and all orders for this visit:    1. Encounter for wellness examination in adult (Primary)  Assessment & Plan:  Fasting labs today, up-to-date on health maintenance      2. Seasonal allergic rhinitis due to pollen  Assessment & Plan:  Rx Allegra and Flonase    Orders:  -     fexofenadine (Allegra Allergy) 180 MG " tablet; Take 1 tablet by mouth Daily.  Dispense: 90 tablet; Refill: 1  -     fluticasone (FLONASE) 50 MCG/ACT nasal spray; 2 sprays into the nostril(s) as directed by provider Daily.  Dispense: 18.2 mL; Refill: 5    3. Type 2 diabetes mellitus with hyperglycemia, without long-term current use of insulin  Assessment & Plan:  Diabetes is improving with lifestyle modifications.   Recommended an ADA diet.  Regular aerobic exercise.  He has discontinued Mounjaro, will recheck A1c today and we discussed potentially starting Farxiga or Jardiance just for cardiac and renal protection  Diabetes will be reassessed in 6 months    Orders:  -     Hemoglobin A1c; Future  -     CBC Auto Differential; Future  -     Comprehensive Metabolic Panel; Future    4. Mixed hyperlipidemia  Assessment & Plan:   Lipid abnormalities are stable    Plan:  Lipid lowering therapy not prescribed due to patient refusal patient is hopeful A1c and cholesterol are both normal, would like to stay off medication if possible.    Counseled patient on lifestyle modifications to help control hyperlipidemia.     Patient Treatment Goals:   LDL goal is less than 70    Followup in 6 months.    Orders:  -     Lipid Panel; Future    5. Acquired hypothyroidism  Assessment & Plan:  Stable, labs today    Orders:  -     TSH; Future    6. Primary hypertension  Assessment & Plan:  Hypertension is stable and controlled  Ambulatory blood pressure monitoring.  We will discontinue hydrochlorothiazide and continue with lisinopril 20 mg daily, he will let me know if blood pressures are elevated  Blood pressure will be reassessed in 6 months.    Orders:  -     lisinopril (PRINIVIL,ZESTRIL) 20 MG tablet; Take 1 tablet by mouth Daily.  Dispense: 90 tablet; Refill: 1           Follow Up:   Return in about 6 months (around 11/13/2024) for Recheck with labs.    Marilee Saucedo DO  [facility]

## 2024-05-13 NOTE — ASSESSMENT & PLAN NOTE
Lipid abnormalities are stable    Plan:  Lipid lowering therapy not prescribed due to patient refusal patient is hopeful A1c and cholesterol are both normal, would like to stay off medication if possible.    Counseled patient on lifestyle modifications to help control hyperlipidemia.     Patient Treatment Goals:   LDL goal is less than 70    Followup in 6 months.

## 2024-05-13 NOTE — ASSESSMENT & PLAN NOTE
Hypertension is stable and controlled  Ambulatory blood pressure monitoring.  We will discontinue hydrochlorothiazide and continue with lisinopril 20 mg daily, he will let me know if blood pressures are elevated  Blood pressure will be reassessed in 6 months.

## 2024-05-13 NOTE — ASSESSMENT & PLAN NOTE
Diabetes is improving with lifestyle modifications.   Recommended an ADA diet.  Regular aerobic exercise.  He has discontinued Mounjaro, will recheck A1c today and we discussed potentially starting Farxiga or Jardiance just for cardiac and renal protection  Diabetes will be reassessed in 6 months

## 2024-05-14 LAB
ALBUMIN SERPL-MCNC: 5 G/DL (ref 4.1–5.1)
ALBUMIN/GLOB SERPL: 1.8 {RATIO} (ref 1.2–2.2)
ALP SERPL-CCNC: 92 IU/L (ref 44–121)
ALT SERPL-CCNC: 38 IU/L (ref 0–44)
AST SERPL-CCNC: 22 IU/L (ref 0–40)
BASOPHILS # BLD AUTO: 0.1 X10E3/UL (ref 0–0.2)
BASOPHILS NFR BLD AUTO: 1 %
BILIRUB SERPL-MCNC: 0.6 MG/DL (ref 0–1.2)
BUN SERPL-MCNC: 25 MG/DL (ref 6–20)
BUN/CREAT SERPL: 21 (ref 9–20)
CALCIUM SERPL-MCNC: 10.1 MG/DL (ref 8.7–10.2)
CHLORIDE SERPL-SCNC: 99 MMOL/L (ref 96–106)
CHOLEST SERPL-MCNC: 226 MG/DL (ref 100–199)
CO2 SERPL-SCNC: 24 MMOL/L (ref 20–29)
CREAT SERPL-MCNC: 1.18 MG/DL (ref 0.76–1.27)
EGFRCR SERPLBLD CKD-EPI 2021: 84 ML/MIN/1.73
EOSINOPHIL # BLD AUTO: 0.2 X10E3/UL (ref 0–0.4)
EOSINOPHIL NFR BLD AUTO: 3 %
ERYTHROCYTE [DISTWIDTH] IN BLOOD BY AUTOMATED COUNT: 13.3 % (ref 11.6–15.4)
GLOBULIN SER CALC-MCNC: 2.8 G/DL (ref 1.5–4.5)
GLUCOSE SERPL-MCNC: 133 MG/DL (ref 70–99)
HBA1C MFR BLD: 6.9 % (ref 4.8–5.6)
HCT VFR BLD AUTO: 53.5 % (ref 37.5–51)
HDLC SERPL-MCNC: 38 MG/DL
HGB BLD-MCNC: 17.5 G/DL (ref 13–17.7)
IMM GRANULOCYTES # BLD AUTO: 0 X10E3/UL (ref 0–0.1)
IMM GRANULOCYTES NFR BLD AUTO: 0 %
LDLC SERPL CALC-MCNC: 165 MG/DL (ref 0–99)
LYMPHOCYTES # BLD AUTO: 2.1 X10E3/UL (ref 0.7–3.1)
LYMPHOCYTES NFR BLD AUTO: 25 %
MCH RBC QN AUTO: 30.1 PG (ref 26.6–33)
MCHC RBC AUTO-ENTMCNC: 32.7 G/DL (ref 31.5–35.7)
MCV RBC AUTO: 92 FL (ref 79–97)
MONOCYTES # BLD AUTO: 0.9 X10E3/UL (ref 0.1–0.9)
MONOCYTES NFR BLD AUTO: 10 %
NEUTROPHILS # BLD AUTO: 5.3 X10E3/UL (ref 1.4–7)
NEUTROPHILS NFR BLD AUTO: 61 %
PLATELET # BLD AUTO: 263 X10E3/UL (ref 150–450)
POTASSIUM SERPL-SCNC: 4.7 MMOL/L (ref 3.5–5.2)
PROT SERPL-MCNC: 7.8 G/DL (ref 6–8.5)
RBC # BLD AUTO: 5.81 X10E6/UL (ref 4.14–5.8)
SODIUM SERPL-SCNC: 140 MMOL/L (ref 134–144)
TRIGL SERPL-MCNC: 124 MG/DL (ref 0–149)
TSH SERPL DL<=0.005 MIU/L-ACNC: 1.23 UIU/ML (ref 0.45–4.5)
VLDLC SERPL CALC-MCNC: 23 MG/DL (ref 5–40)
WBC # BLD AUTO: 8.6 X10E3/UL (ref 3.4–10.8)

## 2024-06-11 DIAGNOSIS — I10 PRIMARY HYPERTENSION: ICD-10-CM

## 2024-06-12 RX ORDER — LEVOTHYROXINE SODIUM 0.05 MG/1
50 TABLET ORAL DAILY
Qty: 90 TABLET | Refills: 1 | Status: SHIPPED | OUTPATIENT
Start: 2024-06-12

## 2024-06-12 RX ORDER — LISINOPRIL 20 MG/1
20 TABLET ORAL DAILY
Qty: 90 TABLET | Refills: 1 | Status: SHIPPED | OUTPATIENT
Start: 2024-06-12

## 2024-07-31 ENCOUNTER — OFFICE VISIT (OUTPATIENT)
Dept: FAMILY MEDICINE CLINIC | Facility: CLINIC | Age: 34
End: 2024-07-31
Payer: COMMERCIAL

## 2024-07-31 VITALS
HEIGHT: 69 IN | DIASTOLIC BLOOD PRESSURE: 98 MMHG | SYSTOLIC BLOOD PRESSURE: 142 MMHG | BODY MASS INDEX: 32.09 KG/M2 | WEIGHT: 216.7 LBS | OXYGEN SATURATION: 97 % | HEART RATE: 84 BPM

## 2024-07-31 DIAGNOSIS — L30.4 INTERTRIGO: ICD-10-CM

## 2024-07-31 DIAGNOSIS — E11.65 TYPE 2 DIABETES MELLITUS WITH HYPERGLYCEMIA, WITHOUT LONG-TERM CURRENT USE OF INSULIN: Primary | ICD-10-CM

## 2024-07-31 DIAGNOSIS — E78.2 MIXED HYPERLIPIDEMIA: ICD-10-CM

## 2024-07-31 DIAGNOSIS — I10 PRIMARY HYPERTENSION: ICD-10-CM

## 2024-07-31 DIAGNOSIS — J30.1 SEASONAL ALLERGIC RHINITIS DUE TO POLLEN: ICD-10-CM

## 2024-07-31 DIAGNOSIS — E03.9 ACQUIRED HYPOTHYROIDISM: ICD-10-CM

## 2024-07-31 PROCEDURE — 99214 OFFICE O/P EST MOD 30 MIN: CPT | Performed by: FAMILY MEDICINE

## 2024-07-31 PROCEDURE — 36415 COLL VENOUS BLD VENIPUNCTURE: CPT | Performed by: FAMILY MEDICINE

## 2024-07-31 RX ORDER — LISINOPRIL 20 MG/1
40 TABLET ORAL DAILY
Qty: 180 TABLET | Refills: 1 | Status: SHIPPED | OUTPATIENT
Start: 2024-07-31

## 2024-07-31 RX ORDER — FLUTICASONE PROPIONATE 50 MCG
2 SPRAY, SUSPENSION (ML) NASAL DAILY
Qty: 18.2 ML | Refills: 5 | Status: SHIPPED | OUTPATIENT
Start: 2024-07-31

## 2024-07-31 RX ORDER — NYSTATIN AND TRIAMCINOLONE ACETONIDE 100000; 1 [USP'U]/G; MG/G
1 OINTMENT TOPICAL 2 TIMES DAILY
Qty: 120 G | Refills: 5 | Status: SHIPPED | OUTPATIENT
Start: 2024-07-31

## 2024-07-31 NOTE — PROGRESS NOTES
Date: 2024   Patient Name: Lexx Reyna  : 1990   MRN: [id number]     Chief Complaint:    Chief Complaint   Patient presents with    Diabetes    Hypertension    Hypothyroidism       History of Present Illness  The patient presents for evaluation of multiple medical concerns.    The patient's weight remains stable at 215 pounds, however, she expresses a desire to achieve it below this weight. She reports an overall improvement in her condition. Her most recent A1c test, conducted approximately 2 to 3 months ago, has decreased to 97. Her morning blood sugar levels typically range from 120 to 140, but decrease to under 120 by the afternoon. She expresses a desire to have her A1c levels evaluated and inquires about fasting insulin testing.    The patient expresses concern about her triglyceride levels.    The patient monitors her blood pressure at home, which consistently measures around 130/80. She inquires about the necessity of increasing her lisinopril dosage.    The patient has not previously undergone a sleep study. She reports difficulty initiating sleep, particularly after strenuous activities. She recounts an incident last night where she woke up at 4:00 AM without any apparent cause, which she attributes to nightmares from the residential ?. She also experienced tachycardia this morning.    Supplemental Information  Her anxiety has improved since she stopped taking her medications at the same time. She takes levothyroxine in the morning and lisinopril in the afternoon.      Results  Laboratory Studies  A1c was 6.9 in May. Fasting blood sugar is 97.           Review of Systems:   Review of Systems   Respiratory:  Positive for shortness of breath.    Cardiovascular:  Positive for palpitations. Negative for chest pain.   Gastrointestinal:  Negative for nausea.   Neurological:  Positive for confusion. Negative for dizziness.   Psychiatric/Behavioral:  Negative for depressed mood.        Past  "Medical History:   Past Medical History:   Diagnosis Date    Allergic 1999    Basic summer and spring allergies    Diabetes mellitus     Hyperlipidemia     Hypertension     Hyperthyroidism        Past Surgical History: History reviewed. No pertinent surgical history.    Family History:   Family History   Problem Relation Age of Onset    Cancer Mother     Kidney disease Father        Social History:   Social History     Socioeconomic History    Marital status: Single   Tobacco Use    Smoking status: Never    Smokeless tobacco: Never   Vaping Use    Vaping status: Never Used   Substance and Sexual Activity    Alcohol use: No    Drug use: No    Sexual activity: Defer       Medications:     Current Outpatient Medications:     fluticasone (FLONASE) 50 MCG/ACT nasal spray, 2 sprays into the nostril(s) as directed by provider Daily., Disp: 18.2 mL, Rfl: 5    levothyroxine (SYNTHROID, LEVOTHROID) 50 MCG tablet, Take 1 tablet by mouth Daily., Disp: 90 tablet, Rfl: 1    lisinopril (PRINIVIL,ZESTRIL) 20 MG tablet, Take 2 tablets by mouth Daily., Disp: 180 tablet, Rfl: 1    nystatin-triamcinolone (MYCOLOG) 589898-1.1 UNIT/GM-% ointment, Apply 1 Application topically to the appropriate area as directed 2 (Two) Times a Day., Disp: 120 g, Rfl: 5    Allergies:   No Known Allergies    PHQ-2 Total Score:     PHQ-9 Total Score:       Physical Exam:  Vital Signs:   Vitals:    07/31/24 0836   BP: 142/98   BP Location: Left arm   Patient Position: Sitting   Cuff Size: Large Adult   Pulse: 84   SpO2: 97%   Weight: 98.3 kg (216 lb 11.2 oz)   Height: 175.3 cm (69\")     Body mass index is 32 kg/m².     Physical Exam  Vitals and nursing note reviewed.   Constitutional:       Appearance: Normal appearance.   HENT:      Head: Normocephalic.   Cardiovascular:      Rate and Rhythm: Normal rate and regular rhythm.      Heart sounds: Normal heart sounds.   Pulmonary:      Effort: Pulmonary effort is normal.      Breath sounds: Normal breath sounds. "   Neurological:      Mental Status: He is alert and oriented to person, place, and time.   Psychiatric:         Mood and Affect: Mood normal.         Behavior: Behavior normal.           Assessment/Plan:   Diagnoses and all orders for this visit:    1. Type 2 diabetes mellitus with hyperglycemia, without long-term current use of insulin (Primary)  -     Hemoglobin A1c; Future  -     CBC Auto Differential; Future  -     Comprehensive Metabolic Panel; Future  -     Insulin & C-Peptide, Serum; Future    2. Seasonal allergic rhinitis due to pollen  -     fluticasone (FLONASE) 50 MCG/ACT nasal spray; 2 sprays into the nostril(s) as directed by provider Daily.  Dispense: 18.2 mL; Refill: 5    3. Mixed hyperlipidemia  -     Lipid Panel; Future    4. Primary hypertension  -     lisinopril (PRINIVIL,ZESTRIL) 20 MG tablet; Take 2 tablets by mouth Daily.  Dispense: 180 tablet; Refill: 1    5. Intertrigo  -     nystatin-triamcinolone (MYCOLOG) 476301-7.1 UNIT/GM-% ointment; Apply 1 Application topically to the appropriate area as directed 2 (Two) Times a Day.  Dispense: 120 g; Refill: 5    6. Acquired hypothyroidism  -     TSH; Future  -     T4, Free; Future         Assessment & Plan  1. Diabetes.  The patient's A1c was recorded at 6.9 in 05/2023. A reevaluation of her A1c levels is planned.    2. Hypertension.  The dosage of lisinopril will be increased to 40 mg.    3. Sleep apnea.  An in-home sleep study will be ordered.    4. Rash.  The rash is likely a yeast infection, particularly in the affected areas due to sweat and friction. The rash appears to be consistent with eczema. A prescription for a combination cream with a steroid and anti-yeast medicine will be provided. She is advised to keep the area clean and dry.      Follow Up:   Return in about 6 months (around 1/31/2025) for Recheck with labs.    Patient or patient representative verbalized consent for the use of Ambient Listening during the visit with  Marilee Blackmon  DO Lewis for chart documentation. 7/31/2024  09:03 EDT    Marilee Saucedo DO  [facility]

## 2024-08-01 LAB
ALBUMIN SERPL-MCNC: 4.7 G/DL (ref 4.1–5.1)
ALP SERPL-CCNC: 108 IU/L (ref 44–121)
ALT SERPL-CCNC: 111 IU/L (ref 0–44)
AST SERPL-CCNC: 40 IU/L (ref 0–40)
BASOPHILS # BLD AUTO: 0.1 X10E3/UL (ref 0–0.2)
BASOPHILS NFR BLD AUTO: 1 %
BILIRUB SERPL-MCNC: 0.4 MG/DL (ref 0–1.2)
BUN SERPL-MCNC: 24 MG/DL (ref 6–20)
BUN/CREAT SERPL: 21 (ref 9–20)
C PEPTIDE SERPL-MCNC: 4 NG/ML (ref 1.1–4.4)
CALCIUM SERPL-MCNC: 9.8 MG/DL (ref 8.7–10.2)
CHLORIDE SERPL-SCNC: 101 MMOL/L (ref 96–106)
CHOLEST SERPL-MCNC: 218 MG/DL (ref 100–199)
CO2 SERPL-SCNC: 22 MMOL/L (ref 20–29)
CREAT SERPL-MCNC: 1.13 MG/DL (ref 0.76–1.27)
EGFRCR SERPLBLD CKD-EPI 2021: 89 ML/MIN/1.73
EOSINOPHIL # BLD AUTO: 0.2 X10E3/UL (ref 0–0.4)
EOSINOPHIL NFR BLD AUTO: 4 %
ERYTHROCYTE [DISTWIDTH] IN BLOOD BY AUTOMATED COUNT: 12.3 % (ref 11.6–15.4)
GLOBULIN SER CALC-MCNC: 2.5 G/DL (ref 1.5–4.5)
GLUCOSE SERPL-MCNC: 134 MG/DL (ref 70–99)
HBA1C MFR BLD: 6.3 % (ref 4.8–5.6)
HCT VFR BLD AUTO: 51.4 % (ref 37.5–51)
HDLC SERPL-MCNC: 33 MG/DL
HGB BLD-MCNC: 17.4 G/DL (ref 13–17.7)
IMM GRANULOCYTES # BLD AUTO: 0 X10E3/UL (ref 0–0.1)
IMM GRANULOCYTES NFR BLD AUTO: 0 %
INSULIN SERPL-ACNC: 19.1 UIU/ML (ref 2.6–24.9)
LDLC SERPL CALC-MCNC: 152 MG/DL (ref 0–99)
LYMPHOCYTES # BLD AUTO: 1.6 X10E3/UL (ref 0.7–3.1)
LYMPHOCYTES NFR BLD AUTO: 29 %
MCH RBC QN AUTO: 30.7 PG (ref 26.6–33)
MCHC RBC AUTO-ENTMCNC: 33.9 G/DL (ref 31.5–35.7)
MCV RBC AUTO: 91 FL (ref 79–97)
MONOCYTES # BLD AUTO: 0.6 X10E3/UL (ref 0.1–0.9)
MONOCYTES NFR BLD AUTO: 11 %
NEUTROPHILS # BLD AUTO: 3.1 X10E3/UL (ref 1.4–7)
NEUTROPHILS NFR BLD AUTO: 55 %
PLATELET # BLD AUTO: 229 X10E3/UL (ref 150–450)
POTASSIUM SERPL-SCNC: 4.5 MMOL/L (ref 3.5–5.2)
PROT SERPL-MCNC: 7.2 G/DL (ref 6–8.5)
RBC # BLD AUTO: 5.67 X10E6/UL (ref 4.14–5.8)
SODIUM SERPL-SCNC: 138 MMOL/L (ref 134–144)
T4 FREE SERPL-MCNC: 1.44 NG/DL (ref 0.82–1.77)
TRIGL SERPL-MCNC: 178 MG/DL (ref 0–149)
TSH SERPL DL<=0.005 MIU/L-ACNC: 3.18 UIU/ML (ref 0.45–4.5)
VLDLC SERPL CALC-MCNC: 33 MG/DL (ref 5–40)
WBC # BLD AUTO: 5.7 X10E3/UL (ref 3.4–10.8)

## 2024-08-25 DIAGNOSIS — R74.8 ELEVATED LIVER ENZYMES: ICD-10-CM

## 2024-08-25 DIAGNOSIS — E78.2 MIXED HYPERLIPIDEMIA: Primary | ICD-10-CM

## 2024-11-12 DIAGNOSIS — I10 PRIMARY HYPERTENSION: ICD-10-CM

## 2024-11-13 ENCOUNTER — OFFICE VISIT (OUTPATIENT)
Dept: FAMILY MEDICINE CLINIC | Facility: CLINIC | Age: 34
End: 2024-11-13
Payer: COMMERCIAL

## 2024-11-13 VITALS
SYSTOLIC BLOOD PRESSURE: 132 MMHG | BODY MASS INDEX: 31.04 KG/M2 | HEIGHT: 69 IN | WEIGHT: 209.6 LBS | DIASTOLIC BLOOD PRESSURE: 88 MMHG | OXYGEN SATURATION: 98 % | HEART RATE: 78 BPM

## 2024-11-13 DIAGNOSIS — E03.9 ACQUIRED HYPOTHYROIDISM: ICD-10-CM

## 2024-11-13 DIAGNOSIS — I10 PRIMARY HYPERTENSION: Primary | ICD-10-CM

## 2024-11-13 DIAGNOSIS — E78.2 MIXED HYPERLIPIDEMIA: ICD-10-CM

## 2024-11-13 DIAGNOSIS — E11.65 TYPE 2 DIABETES MELLITUS WITH HYPERGLYCEMIA, WITHOUT LONG-TERM CURRENT USE OF INSULIN: ICD-10-CM

## 2024-11-13 DIAGNOSIS — R74.8 ELEVATED LIVER ENZYMES: ICD-10-CM

## 2024-11-13 PROCEDURE — 99214 OFFICE O/P EST MOD 30 MIN: CPT | Performed by: FAMILY MEDICINE

## 2024-11-13 RX ORDER — LISINOPRIL 20 MG/1
20 TABLET ORAL DAILY
Qty: 90 TABLET | Refills: 1 | Status: SHIPPED | OUTPATIENT
Start: 2024-11-13

## 2024-11-13 RX ORDER — LEVOTHYROXINE SODIUM 50 UG/1
50 TABLET ORAL DAILY
Qty: 90 TABLET | Refills: 1 | Status: SHIPPED | OUTPATIENT
Start: 2024-11-13

## 2024-11-13 RX ORDER — LISINOPRIL 20 MG/1
40 TABLET ORAL DAILY
Qty: 180 TABLET | Refills: 1 | Status: SHIPPED | OUTPATIENT
Start: 2024-11-13 | End: 2024-11-13

## 2024-11-13 NOTE — PROGRESS NOTES
Date: 2024   Patient Name: Lexx Reyna  : 1990   MRN: [id number]     Chief Complaint:    Chief Complaint   Patient presents with    Diabetes    Hypertension       History of Present Illness  Patient presents for follow-up of diabetes, hypertension, hypothyroidism, and hyperlipidemia.  He also has history of elevated liver enzymes.  He is doing well and working on lifestyle changes, BP is well-controlled in office today.           Review of Systems:   Review of Systems   Constitutional:  Negative for fatigue and unexpected weight loss.   Eyes:  Negative for blurred vision.   Respiratory:  Negative for cough, chest tightness and shortness of breath.    Cardiovascular:  Negative for chest pain, palpitations and leg swelling.   Gastrointestinal:  Negative for abdominal pain, constipation, diarrhea, nausea and vomiting.   Endocrine: Negative for polydipsia and polyuria.   Neurological:  Negative for dizziness, tremors, speech difficulty, headache and confusion.   Psychiatric/Behavioral:  Negative for depressed mood. The patient is not nervous/anxious.        Past Medical History:   Past Medical History:   Diagnosis Date    Allergic     Basic summer and spring allergies    Diabetes mellitus     Hyperlipidemia     Hypertension     Hyperthyroidism        Past Surgical History: History reviewed. No pertinent surgical history.    Family History:   Family History   Problem Relation Age of Onset    Cancer Mother     Kidney disease Father        Social History:   Social History     Socioeconomic History    Marital status: Single   Tobacco Use    Smoking status: Never    Smokeless tobacco: Never   Vaping Use    Vaping status: Never Used   Substance and Sexual Activity    Alcohol use: No    Drug use: No    Sexual activity: Defer       Medications:     Current Outpatient Medications:     levothyroxine (SYNTHROID, LEVOTHROID) 50 MCG tablet, Take 1 tablet by mouth Daily., Disp: 90 tablet, Rfl: 1     "lisinopril (PRINIVIL,ZESTRIL) 20 MG tablet, Take 1 tablet by mouth Daily., Disp: 90 tablet, Rfl: 1    Allergies:   No Known Allergies    PHQ-2 Total Score:    PHQ-9 Total Score:      Physical Exam:  Vital Signs:   Vitals:    11/13/24 0846   BP: 132/88   BP Location: Left arm   Patient Position: Sitting   Cuff Size: Adult   Pulse: 78   SpO2: 98%   Weight: 95.1 kg (209 lb 9.6 oz)   Height: 175.3 cm (69\")     Body mass index is 30.95 kg/m².     Physical Exam  Vitals and nursing note reviewed.   Constitutional:       Appearance: Normal appearance. He is obese.   Cardiovascular:      Rate and Rhythm: Normal rate and regular rhythm.      Heart sounds: Normal heart sounds.   Pulmonary:      Effort: Pulmonary effort is normal.      Breath sounds: Normal breath sounds.   Neurological:      Mental Status: He is alert and oriented to person, place, and time. Mental status is at baseline.   Psychiatric:         Mood and Affect: Mood normal.         Behavior: Behavior normal.           Assessment/Plan:   Diagnoses and all orders for this visit:    1. Primary hypertension (Primary)  -     lisinopril (PRINIVIL,ZESTRIL) 20 MG tablet; Take 1 tablet by mouth Daily.  Dispense: 90 tablet; Refill: 1    2. Type 2 diabetes mellitus with hyperglycemia, without long-term current use of insulin  -     Hemoglobin A1c; Future  -     Hemoglobin A1c    3. Acquired hypothyroidism  Assessment & Plan:  Stable, labs today    Orders:  -     TSH; Future  -     T4, Free; Future  -     T4, Free  -     TSH    4. Mixed hyperlipidemia  Assessment & Plan:   Lipid abnormalities are stable    Plan:  Lipid lowering therapy not prescribed due to patient refusal patient is hopeful A1c and cholesterol are both normal, would like to stay off medication if possible.    Counseled patient on lifestyle modifications to help control hyperlipidemia.     Patient Treatment Goals:   LDL goal is less than 70    Followup in 6 months.     Orders:  -     Lipid Panel; Future  - "     Hepatic Function Panel; Future  -     Basic Metabolic Panel; Future  -     Basic Metabolic Panel  -     Hepatic Function Panel  -     Lipid Panel    5. Elevated liver enzymes  Assessment & Plan:  Improving, labs today    Orders:  -     Lipid Panel; Future  -     Hepatic Function Panel; Future  -     Basic Metabolic Panel; Future  -     Basic Metabolic Panel  -     Hepatic Function Panel  -     Lipid Panel         Follow Up:   Return in about 6 months (around 5/13/2025) for Annual physical.        Marilee Saucedo,   [facility]

## 2024-11-14 LAB
ALBUMIN SERPL-MCNC: 4.9 G/DL (ref 4.1–5.1)
ALP SERPL-CCNC: 126 IU/L (ref 44–121)
ALT SERPL-CCNC: 38 IU/L (ref 0–44)
AST SERPL-CCNC: 22 IU/L (ref 0–40)
BILIRUB DIRECT SERPL-MCNC: 0.14 MG/DL (ref 0–0.4)
BILIRUB SERPL-MCNC: 0.5 MG/DL (ref 0–1.2)
BUN SERPL-MCNC: 22 MG/DL (ref 6–20)
BUN/CREAT SERPL: 19 (ref 9–20)
CALCIUM SERPL-MCNC: 10.1 MG/DL (ref 8.7–10.2)
CHLORIDE SERPL-SCNC: 105 MMOL/L (ref 96–106)
CHOLEST SERPL-MCNC: 231 MG/DL (ref 100–199)
CO2 SERPL-SCNC: 21 MMOL/L (ref 20–29)
CREAT SERPL-MCNC: 1.15 MG/DL (ref 0.76–1.27)
EGFRCR SERPLBLD CKD-EPI 2021: 86 ML/MIN/1.73
GLUCOSE SERPL-MCNC: 114 MG/DL (ref 70–99)
HDLC SERPL-MCNC: 39 MG/DL
LDLC SERPL CALC-MCNC: 175 MG/DL (ref 0–99)
POTASSIUM SERPL-SCNC: 4.8 MMOL/L (ref 3.5–5.2)
PROT SERPL-MCNC: 7.7 G/DL (ref 6–8.5)
SODIUM SERPL-SCNC: 142 MMOL/L (ref 134–144)
T4 FREE SERPL-MCNC: 1.36 NG/DL (ref 0.82–1.77)
TRIGL SERPL-MCNC: 97 MG/DL (ref 0–149)
TSH SERPL DL<=0.005 MIU/L-ACNC: 4.12 UIU/ML (ref 0.45–4.5)
VLDLC SERPL CALC-MCNC: 17 MG/DL (ref 5–40)

## 2024-11-26 NOTE — PROGRESS NOTES
[facility]  The stated that they did not have an order for A1c.  Order can not be added on.  Patient will need to come back in to get his A1C

## 2025-01-14 ENCOUNTER — OFFICE VISIT (OUTPATIENT)
Dept: FAMILY MEDICINE CLINIC | Facility: CLINIC | Age: 35
End: 2025-01-14
Payer: COMMERCIAL

## 2025-01-14 VITALS
TEMPERATURE: 97.7 F | WEIGHT: 205 LBS | SYSTOLIC BLOOD PRESSURE: 128 MMHG | DIASTOLIC BLOOD PRESSURE: 90 MMHG | OXYGEN SATURATION: 99 % | BODY MASS INDEX: 30.36 KG/M2 | HEART RATE: 66 BPM | HEIGHT: 69 IN

## 2025-01-14 DIAGNOSIS — E03.9 ACQUIRED HYPOTHYROIDISM: Primary | ICD-10-CM

## 2025-01-14 DIAGNOSIS — I10 PRIMARY HYPERTENSION: ICD-10-CM

## 2025-01-14 DIAGNOSIS — E11.65 TYPE 2 DIABETES MELLITUS WITH HYPERGLYCEMIA, WITHOUT LONG-TERM CURRENT USE OF INSULIN: ICD-10-CM

## 2025-01-14 LAB
EXPIRATION DATE: ABNORMAL
HBA1C MFR BLD: 5.9 % (ref 4.5–5.7)
Lab: ABNORMAL

## 2025-01-14 PROCEDURE — 99214 OFFICE O/P EST MOD 30 MIN: CPT | Performed by: FAMILY MEDICINE

## 2025-01-14 PROCEDURE — 83036 HEMOGLOBIN GLYCOSYLATED A1C: CPT | Performed by: FAMILY MEDICINE

## 2025-01-14 RX ORDER — LISINOPRIL 20 MG/1
20 TABLET ORAL DAILY
Qty: 90 TABLET | Refills: 1 | Status: SHIPPED | OUTPATIENT
Start: 2025-01-14

## 2025-01-14 RX ORDER — LEVOTHYROXINE SODIUM 50 UG/1
50 TABLET ORAL DAILY
Qty: 90 TABLET | Refills: 1 | Status: SHIPPED | OUTPATIENT
Start: 2025-01-14

## 2025-01-14 NOTE — PROGRESS NOTES
Answers submitted by the patient for this visit:  Primary Reason for Visit (Submitted on 2025)  What is the primary reason for your visit?: Diabetes  Diabetes Questionnaire (Submitted on 2025)  Chief Complaint: Diabetes problem  Diabetes type: type 2  MedicAlert ID: No  Disease duration: 10 Years  Treatment compliance: all of the time  Symptom course: improving  Home blood tests: 1-2 x per day  High score: 110-130  Below 70: never  blurred vision: Yes  foot paresthesias: No  foot ulcerations: No  weight loss: No  blackouts: No  hospitalization: No  nocturnal hypoglycemia: No  required assistance: No  required glucagon: No  sweats: No  Current diet: generally healthy  Meal planning: carbohydrate counting, avoidance of concentrated sweets, low carbohydrate diet  Exercise: every other day  Eye exam current: No  Sees podiatrist: No      Follow Up Office Visit      Date: 2025   Patient Name: Lexx Reyna  : 1990   MRN: [id number]   PCP: Patient Care Team:  Marilee Saucedo DO as PCP - General (Family Medicine)     Chief Complaint:    Chief Complaint   Patient presents with    Diabetes    Hypertension       History of Present Illness: Lexx Reyna is a 34 y.o. male who is here today for A1c check.  Patient also wanting refills of medications.  Patient does report that he is checking his glucose in the morning and in the evenings and in the mornings it is running between  in the evenings it is running between 110 and 120.  Patient's previous labs included everything except A1c.  Patient's last A1c was 3120, 6.3.  Patient reports has been controlling diet and exercising.  Patient denies any signs or symptoms at this time and reports diabetes is well-controlled.    Subjective      Review of Systems:   Review of Systems   Constitutional:  Negative for activity change and diaphoresis.   HENT:  Negative for congestion and sinus pressure.    Eyes:  Negative for redness.  "  Respiratory:  Negative for shortness of breath.    Cardiovascular:  Negative for chest pain.   Gastrointestinal:  Negative for diarrhea and nausea.   Endocrine: Positive for polydipsia and polyuria.   Genitourinary:  Negative for difficulty urinating.   Neurological:  Negative for tremors, speech difficulty and headaches.   Psychiatric/Behavioral:  Negative for agitation and confusion.         I have reviewed the patients family history, social history, past medical history, past surgical history and have updated it as appropriate.     Medications:     Current Outpatient Medications:     levothyroxine (SYNTHROID, LEVOTHROID) 50 MCG tablet, Take 1 tablet by mouth Daily., Disp: 90 tablet, Rfl: 1    lisinopril (PRINIVIL,ZESTRIL) 20 MG tablet, Take 1 tablet by mouth Daily., Disp: 90 tablet, Rfl: 1    Allergies:   No Known Allergies    Objective     Physical Exam: Please see above  Vital Signs:   Vitals:    01/14/25 0819   BP: 128/90   Pulse: 66   Temp: 97.7 °F (36.5 °C)   TempSrc: Infrared   SpO2: 99%   Weight: 93 kg (205 lb)   Height: 175.3 cm (69\")   PainSc: 0-No pain     Body mass index is 30.27 kg/m².  BMI is >= 30 and <35. (Class 1 Obesity). The following options were offered after discussion;: weight loss educational material (shared in after visit summary)       Physical Exam  Vitals and nursing note reviewed.   HENT:      Head: Normocephalic.      Nose: Nose normal.   Eyes:      Pupils: Pupils are equal, round, and reactive to light.   Cardiovascular:      Rate and Rhythm: Normal rate and regular rhythm.   Pulmonary:      Effort: Pulmonary effort is normal.      Breath sounds: Normal breath sounds.   Abdominal:      General: Bowel sounds are normal.   Musculoskeletal:         General: Normal range of motion.      Cervical back: Normal range of motion.   Skin:     General: Skin is warm and dry.   Neurological:      General: No focal deficit present.      Mental Status: He is alert and oriented to person, place, " and time.   Psychiatric:         Mood and Affect: Mood normal.         Procedures    Results:   Labs:   Hemoglobin A1C   Date Value Ref Range Status   07/31/2024 6.3 (H) 4.8 - 5.6 % Final     Comment:              Prediabetes: 5.7 - 6.4           Diabetes: >6.4           Glycemic control for adults with diabetes: <7.0   02/13/2024 7.1 (A) 4.5 - 5.7 % Final     TSH   Date Value Ref Range Status   11/13/2024 4.120 0.450 - 4.500 uIU/mL Final        POCT Results (if applicable):   Results for orders placed or performed in visit on 11/13/24   Basic Metabolic Panel    Collection Time: 11/13/24  9:27 AM    Specimen: Arm, Left; Blood    Blood  Release to annamaria   Result Value Ref Range    Glucose 114 (H) 70 - 99 mg/dL    BUN 22 (H) 6 - 20 mg/dL    Creatinine 1.15 0.76 - 1.27 mg/dL    EGFR Result 86 >59 mL/min/1.73    BUN/Creatinine Ratio 19 9 - 20    Sodium 142 134 - 144 mmol/L    Potassium 4.8 3.5 - 5.2 mmol/L    Chloride 105 96 - 106 mmol/L    Total CO2 21 20 - 29 mmol/L    Calcium 10.1 8.7 - 10.2 mg/dL   Hepatic Function Panel    Collection Time: 11/13/24  9:27 AM    Specimen: Arm, Left; Blood    Blood  Release to annamaria   Result Value Ref Range    Total Protein 7.7 6.0 - 8.5 g/dL    Albumin 4.9 4.1 - 5.1 g/dL    Total Bilirubin 0.5 0.0 - 1.2 mg/dL    Bilirubin, Direct 0.14 0.00 - 0.40 mg/dL    Alkaline Phosphatase 126 (H) 44 - 121 IU/L    AST (SGOT) 22 0 - 40 IU/L    ALT (SGPT) 38 0 - 44 IU/L   Lipid Panel    Collection Time: 11/13/24  9:27 AM    Specimen: Arm, Left; Blood    Blood  Release to annamaria   Result Value Ref Range    Total Cholesterol 231 (H) 100 - 199 mg/dL    Triglycerides 97 0 - 149 mg/dL    HDL Cholesterol 39 (L) >39 mg/dL    VLDL Cholesterol Edis 17 5 - 40 mg/dL    LDL Chol Calc (NIH) 175 (H) 0 - 99 mg/dL   T4, Free    Collection Time: 11/13/24  9:27 AM    Specimen: Arm, Left; Blood    Blood  Release to annamaria   Result Value Ref Range    Free T4 1.36 0.82 - 1.77 ng/dL   TSH    Collection Time: 11/13/24  9:27 AM     Specimen: Arm, Left; Blood    Blood  Release to annamaria   Result Value Ref Range    TSH 4.120 0.450 - 4.500 uIU/mL           Assessment / Plan      Assessment/Plan:   Diagnoses and all orders for this visit:    1. Acquired hypothyroidism (Primary)  -     levothyroxine (SYNTHROID, LEVOTHROID) 50 MCG tablet; Take 1 tablet by mouth Daily.  Dispense: 90 tablet; Refill: 1    2. Primary hypertension  -     lisinopril (PRINIVIL,ZESTRIL) 20 MG tablet; Take 1 tablet by mouth Daily.  Dispense: 90 tablet; Refill: 1    3. Type 2 diabetes mellitus with hyperglycemia, without long-term current use of insulin  -     POC Glycosylated Hemoglobin (Hb A1C)          Follow Up:   Return in about 3 months (around 4/14/2025) for Next scheduled follow up.      LISY Valentine  [facility]   Electronically signed by LISY Valentine, 01/14/25, 8:25 AM EST.

## 2025-05-19 ENCOUNTER — LAB (OUTPATIENT)
Dept: FAMILY MEDICINE CLINIC | Facility: CLINIC | Age: 35
End: 2025-05-19
Payer: COMMERCIAL

## 2025-05-19 DIAGNOSIS — E11.65 TYPE 2 DIABETES MELLITUS WITH HYPERGLYCEMIA, WITHOUT LONG-TERM CURRENT USE OF INSULIN: ICD-10-CM

## 2025-05-21 ENCOUNTER — RESULTS FOLLOW-UP (OUTPATIENT)
Dept: FAMILY MEDICINE CLINIC | Facility: CLINIC | Age: 35
End: 2025-05-21
Payer: OTHER MISCELLANEOUS

## 2025-05-21 LAB — HBA1C MFR BLD: 6.1 % (ref 4.8–5.6)

## 2025-05-24 DIAGNOSIS — I10 PRIMARY HYPERTENSION: ICD-10-CM

## 2025-05-24 DIAGNOSIS — E03.9 ACQUIRED HYPOTHYROIDISM: ICD-10-CM

## 2025-06-03 RX ORDER — LEVOTHYROXINE SODIUM 50 UG/1
50 TABLET ORAL DAILY
Qty: 90 TABLET | Refills: 1 | Status: SHIPPED | OUTPATIENT
Start: 2025-06-03

## 2025-06-03 RX ORDER — LISINOPRIL 20 MG/1
20 TABLET ORAL DAILY
Qty: 90 TABLET | Refills: 1 | Status: SHIPPED | OUTPATIENT
Start: 2025-06-03

## 2025-06-03 NOTE — TELEPHONE ENCOUNTER
Notified pt of the medication refill, pt stated he has not established care with any providers yet and has been looking into establishing with the [facility]

## 2025-07-17 ENCOUNTER — OFFICE VISIT (OUTPATIENT)
Age: 35
End: 2025-07-17
Payer: COMMERCIAL

## 2025-07-17 ENCOUNTER — PATIENT ROUNDING (BHMG ONLY) (OUTPATIENT)
Age: 35
End: 2025-07-17
Payer: COMMERCIAL

## 2025-07-17 VITALS
HEIGHT: 69 IN | DIASTOLIC BLOOD PRESSURE: 84 MMHG | BODY MASS INDEX: 29.38 KG/M2 | OXYGEN SATURATION: 99 % | WEIGHT: 198.4 LBS | HEART RATE: 75 BPM | SYSTOLIC BLOOD PRESSURE: 132 MMHG

## 2025-07-17 DIAGNOSIS — Z00.00 ANNUAL PHYSICAL EXAM: ICD-10-CM

## 2025-07-17 DIAGNOSIS — I10 ESSENTIAL HYPERTENSION: ICD-10-CM

## 2025-07-17 DIAGNOSIS — E11.65 TYPE 2 DIABETES MELLITUS WITH HYPERGLYCEMIA, WITHOUT LONG-TERM CURRENT USE OF INSULIN: Primary | ICD-10-CM

## 2025-07-17 DIAGNOSIS — Z00.00 HEALTHCARE MAINTENANCE: ICD-10-CM

## 2025-07-17 DIAGNOSIS — E03.9 ACQUIRED HYPOTHYROIDISM: ICD-10-CM

## 2025-07-17 DIAGNOSIS — Z23 NEED FOR PNEUMOCOCCAL VACCINATION: ICD-10-CM

## 2025-07-17 DIAGNOSIS — E78.2 MIXED HYPERLIPIDEMIA: ICD-10-CM

## 2025-07-17 PROBLEM — E66.01 MORBID (SEVERE) OBESITY DUE TO EXCESS CALORIES: Status: RESOLVED | Noted: 2023-12-31 | Resolved: 2025-07-17

## 2025-07-17 LAB
EXPIRATION DATE: NORMAL
Lab: NORMAL
POC ALBUMIN, URINE: 30 MG/L
POC CREATININE, URINE: 100 MG/DL
POC URINE ALB/CREA RATIO: <30

## 2025-07-17 NOTE — ASSESSMENT & PLAN NOTE
-Continue Levothyroxine 50 mcg daily  Orders:    TSH Rfx On Abnormal To Free T4; Future    T4, Free; Future    T3, Free; Future    Thyroid Peroxidase Antibody; Future

## 2025-07-17 NOTE — ASSESSMENT & PLAN NOTE
-05/23/2025: T cholesterol 219, , Triglycerides 61, HDL 44  -Patient consumes only meat, tried to  him regarding adding variety to include fiber and less saturated fats in his diet. Advised him to increase consumption of fatty fish as opposed to red meat

## 2025-07-17 NOTE — ASSESSMENT & PLAN NOTE
-Stable, not at goal  -Continue Lisinopril 20 mg daily  -Lifestyle modifications discussed extensively (including diet & exercise)  -Check CMP  Orders:    Comprehensive Metabolic Panel; Future    Magnesium; Future

## 2025-07-17 NOTE — PROGRESS NOTES
Mony 33 YO WM in office to establish care.   During a search for PCP's patient found [facility] office.   He was unaware [facility] had opened in [place].   His visit went well today, no suggestions for changes were made.

## 2025-07-17 NOTE — ASSESSMENT & PLAN NOTE
-Diabetes is improving with lifestyle modifications  -Most recent Hba1c 6.1% in May 2025  -Foot exam & urine/albumin normal today 07/17/2025   Plan:  -Continue lifestyle modifications  -Check CMP  -Repeat Hba1c in around 3 months  -Follow up with Ophthalmologist/Optometrist  Orders:    Comprehensive Metabolic Panel; Future    POC Albumin/Creatinine Ratio Urine

## 2025-07-17 NOTE — PROGRESS NOTES
Chief Complaint  Establish Care and Diabetes    Subjective          Lexx Reyna presents to [facility]  Diabetes      History of Present Illness  The patient presents for evaluation of diabetes, hypertension, hypothyroidism, and hyperlipidemia.    He is seeking a new primary care physician to manage his health, particularly his diabetes. His A1c levels have decreased from around 11% to 6.1%, and he aims to reduce it further to 5.7%. He attributes this improvement to dietary changes, specifically a meat-only diet that he has maintained for the past 1.5 years. This diet has also resulted in weight loss and blood pressure reduction. He has not consumed fruits or vegetables during this period and reports overall health improvement. He has not seen an ophthalmologist in the past 3-4 years but plans to do so with his new insurance. He has not had a foot exam in the past year. He has attempted to reintroduce fruits and vegetables into his diet but experienced adverse reactions such as stomach upset, cramping, and acne. He has been off diabetes medication for the past 1.5 years.    He monitors his blood pressure at home every three days, with readings typically around 128-132/82-85. He takes lisinopril 20 mg at night and levothyroxine 50 mcg in the morning. He is considering discontinuing lisinopril due to a mild cough.    He has a history of high cholesterol but is not overly concerned about it. He is primarily focused on managing his triglycerides, which are influenced by his carbohydrate and sugar intake. He believes his current diet is effective in controlling his cholesterol levels.    He has not undergone thyroid surgery. His TSH levels were checked once, and a thyroid panel was conducted, but his T3 levels were not assessed. He is aware that his T3 levels may be high due to his hypothyroidism.    He engages in regular exercise, including weightlifting.    Occupations: Works for  [employer]  Diet: Meat-only diet for the past 1.5 years  Alcohol: The patient does not consume alcohol  Tobacco: The patient does not smoke cigarettes  Recreational Drugs: The patient does not use recreational drugs       Objective   Vital Signs:   /84   Pulse 75   Ht 175.3 cm (69\")   Wt 90 kg (198 lb 6.4 oz)   SpO2 99%   BMI 29.30 kg/m²     PHQ-9 Total Score:        Physical Exam  Vitals reviewed.   Cardiovascular:      Rate and Rhythm: Normal rate and regular rhythm.      Pulses: Normal pulses.      Heart sounds: Normal heart sounds.   Pulmonary:      Effort: Pulmonary effort is normal.      Breath sounds: Normal breath sounds.   Abdominal:      General: Abdomen is flat.      Palpations: Abdomen is soft.   Musculoskeletal:      Right lower leg: No edema.      Left lower leg: No edema.   Feet:      Comments: Diabetic Foot Exam Performed and Monofilament Test Performed     Neurological:      General: No focal deficit present.      Mental Status: He is alert and oriented to person, place, and time. Mental status is at baseline.   Psychiatric:         Mood and Affect: Mood normal.         Behavior: Behavior normal.         Thought Content: Thought content normal.         Judgment: Judgment normal.          Result Review :   The following data was reviewed by: Patrice Eller MD on 07/17/2025:  Common labs          1/14/2025    08:32 5/19/2025    09:48 5/23/2025    13:01   Common Labs   WBC   5.50       Hemoglobin   17.4       Hematocrit   52.4       Platelets   217       Hemoglobin A1C 5.9  6.1        Details          This result is from an external source.             7/17/2025:  Urine albumin 30 mg/L, Urine creatinine 100 mg/dL, Urine albumin/creatinine ratio <30 mg/g          Current Outpatient Medications on File Prior to Visit   Medication Sig    levothyroxine (SYNTHROID, LEVOTHROID) 50 MCG tablet Take 1 tablet by mouth Daily.    lisinopril (PRINIVIL,ZESTRIL) 20 MG tablet Take 1 tablet by " mouth Daily.     No current facility-administered medications on file prior to visit.           Assessment and Plan         Type 2 diabetes mellitus with hyperglycemia, without long-term current use of insulin  -Diabetes is improving with lifestyle modifications  -Most recent Hba1c 6.1% in May 2025  -Foot exam & urine/albumin normal today 07/17/2025   Plan:  -Continue lifestyle modifications  -Check CMP  -Repeat Hba1c in around 3 months  -Follow up with Ophthalmologist/Optometrist  Orders:    Comprehensive Metabolic Panel; Future    POC Albumin/Creatinine Ratio Urine    Essential hypertension  -Stable, not at goal  -Continue Lisinopril 20 mg daily  -Lifestyle modifications discussed extensively (including diet & exercise)  -Check CMP  Orders:    Comprehensive Metabolic Panel; Future    Magnesium; Future    Mixed hyperlipidemia  -05/23/2025: T cholesterol 219, , Triglycerides 61, HDL 44  -Patient consumes only meat, tried to  him regarding adding variety to include fiber and less saturated fats in his diet. Advised him to increase consumption of fatty fish as opposed to red meat       Acquired hypothyroidism  -Continue Levothyroxine 50 mcg daily  Orders:    TSH Rfx On Abnormal To Free T4; Future    T4, Free; Future    T3, Free; Future    Thyroid Peroxidase Antibody; Future    Annual physical exam    Orders:    CBC Auto Differential; Future    Comprehensive Metabolic Panel; Future    Magnesium; Future    HIV-1 / O / 2 Ag / Antibody; Future    Hepatitis C Antibody; Future    Healthcare maintenance  -Advised patient to get his Hep B vaccine from his pharmacy       Need for pneumococcal vaccination    Orders:    Pneumococcal Conjugate Vaccine 20-Valent (<18 yrs)            Follow Up   Return in about 3 months (around 10/17/2025).    Patient was given instructions and counseling regarding his condition or for health maintenance advice. Please see specific information pulled into the AVS if appropriate.        Transcribed from ambient dictation by Patrice Eller MD.  07/17/25   09:28 EDT    Patient or patient representative verbalized consent for the use of Ambient Listening during the visit with  Patrice Eller MD for chart documentation. 7/17/2025  09:50 EDT

## 2025-07-18 LAB
ALBUMIN SERPL-MCNC: 4.6 G/DL (ref 4.1–5.1)
ALP SERPL-CCNC: 103 IU/L (ref 44–121)
ALT SERPL-CCNC: 28 IU/L (ref 0–44)
AST SERPL-CCNC: 18 IU/L (ref 0–40)
BASOPHILS # BLD AUTO: 0.1 X10E3/UL (ref 0–0.2)
BASOPHILS NFR BLD AUTO: 1 %
BILIRUB SERPL-MCNC: 0.5 MG/DL (ref 0–1.2)
BUN SERPL-MCNC: 25 MG/DL (ref 6–20)
BUN/CREAT SERPL: 21 (ref 9–20)
CALCIUM SERPL-MCNC: 9.5 MG/DL (ref 8.7–10.2)
CHLORIDE SERPL-SCNC: 103 MMOL/L (ref 96–106)
CO2 SERPL-SCNC: 21 MMOL/L (ref 20–29)
CREAT SERPL-MCNC: 1.17 MG/DL (ref 0.76–1.27)
EGFRCR SERPLBLD CKD-EPI 2021: 84 ML/MIN/1.73
EOSINOPHIL # BLD AUTO: 0.1 X10E3/UL (ref 0–0.4)
EOSINOPHIL NFR BLD AUTO: 2 %
ERYTHROCYTE [DISTWIDTH] IN BLOOD BY AUTOMATED COUNT: 12.7 % (ref 11.6–15.4)
GLOBULIN SER CALC-MCNC: 2.6 G/DL (ref 1.5–4.5)
GLUCOSE SERPL-MCNC: 113 MG/DL (ref 70–99)
HCT VFR BLD AUTO: 53.8 % (ref 37.5–51)
HCV IGG SERPL QL IA: NON REACTIVE
HGB BLD-MCNC: 16.8 G/DL (ref 13–17.7)
HIV 1+2 AB+HIV1 P24 AG SERPL QL IA: NON REACTIVE
IMM GRANULOCYTES # BLD AUTO: 0 X10E3/UL (ref 0–0.1)
IMM GRANULOCYTES NFR BLD AUTO: 0 %
LYMPHOCYTES # BLD AUTO: 1.5 X10E3/UL (ref 0.7–3.1)
LYMPHOCYTES NFR BLD AUTO: 26 %
MAGNESIUM SERPL-MCNC: 2.2 MG/DL (ref 1.6–2.3)
MCH RBC QN AUTO: 29.5 PG (ref 26.6–33)
MCHC RBC AUTO-ENTMCNC: 31.2 G/DL (ref 31.5–35.7)
MCV RBC AUTO: 95 FL (ref 79–97)
MONOCYTES # BLD AUTO: 0.7 X10E3/UL (ref 0.1–0.9)
MONOCYTES NFR BLD AUTO: 11 %
NEUTROPHILS # BLD AUTO: 3.5 X10E3/UL (ref 1.4–7)
NEUTROPHILS NFR BLD AUTO: 60 %
PLATELET # BLD AUTO: 207 X10E3/UL (ref 150–450)
POTASSIUM SERPL-SCNC: 4.4 MMOL/L (ref 3.5–5.2)
PROT SERPL-MCNC: 7.2 G/DL (ref 6–8.5)
RBC # BLD AUTO: 5.69 X10E6/UL (ref 4.14–5.8)
SODIUM SERPL-SCNC: 139 MMOL/L (ref 134–144)
T3FREE SERPL-MCNC: 2.6 PG/ML (ref 2–4.4)
T4 FREE SERPL-MCNC: 1.31 NG/DL (ref 0.82–1.77)
T4 FREE SERPL-MCNC: NORMAL NG/DL
THYROPEROXIDASE AB SERPL-ACNC: 18 IU/ML (ref 0–34)
TSH SERPL DL<=0.005 MIU/L-ACNC: 5.58 UIU/ML (ref 0.45–4.5)
WBC # BLD AUTO: 5.9 X10E3/UL (ref 3.4–10.8)

## 2025-07-21 ENCOUNTER — RESULTS FOLLOW-UP (OUTPATIENT)
Age: 35
End: 2025-07-21
Payer: COMMERCIAL

## 2025-07-21 DIAGNOSIS — E03.9 ACQUIRED HYPOTHYROIDISM: ICD-10-CM

## 2025-07-21 RX ORDER — LEVOTHYROXINE SODIUM 150 UG/1
150 TABLET ORAL
Qty: 90 TABLET | Refills: 0 | Status: SHIPPED | OUTPATIENT
Start: 2025-07-21

## 2025-07-22 NOTE — TELEPHONE ENCOUNTER
Repeat thyroid workup ordered for the beginning of September in around 6 weeks from adjusting his levothyroxine dose. He was informed through [employer]

## 2025-07-28 ENCOUNTER — PATIENT ROUNDING (BHMG ONLY) (OUTPATIENT)
Age: 35
End: 2025-07-28
Payer: COMMERCIAL

## 2025-07-28 NOTE — PROGRESS NOTES
Sent Patient following in [email or web address] Message:  My name is Mathew Falk      I am the Practice Manager with   [facility] [place]  [address]  [facility] [id number] [phone].    And    [facility] [address],  [place], [place] [id number] [phone]    I am messaging to officially welcome you to our practice and ask about your recent visit.     How did you hear about our practice/providers?    Tell me about your visit with us. What things went well?         We're always looking for ways to make our patients' experiences even better. Do you have recommendations on ways we may improve?       Overall were you satisfied with your first visit to our practice?        Is there anything else I can do for you?     You may receive a survey from Courtney [employer]led[employer] via text or email to provide feedback about your visit.  We ask that you please take a few minutes to complete the survey and let us know how we are doing.  If for any reason you feel unable to give us the highest rating please let me know.      Thank you, and have a great day.